# Patient Record
Sex: MALE | Race: WHITE | NOT HISPANIC OR LATINO | Employment: OTHER | URBAN - NONMETROPOLITAN AREA
[De-identification: names, ages, dates, MRNs, and addresses within clinical notes are randomized per-mention and may not be internally consistent; named-entity substitution may affect disease eponyms.]

---

## 2017-01-06 ENCOUNTER — TRANSCRIBE ORDERS (OUTPATIENT)
Dept: GENERAL RADIOLOGY | Facility: HOSPITAL | Age: 82
End: 2017-01-06

## 2017-01-06 ENCOUNTER — HOSPITAL ENCOUNTER (OUTPATIENT)
Dept: GENERAL RADIOLOGY | Facility: HOSPITAL | Age: 82
Discharge: HOME | End: 2017-01-06
Admitting: NURSE PRACTITIONER

## 2017-01-06 DIAGNOSIS — R05.9 COUGH: Primary | ICD-10-CM

## 2017-01-06 PROCEDURE — 71020 HC CHEST PA AND LATERAL: CPT

## 2017-05-13 ENCOUNTER — APPOINTMENT (OUTPATIENT)
Dept: GENERAL RADIOLOGY | Facility: HOSPITAL | Age: 82
End: 2017-05-13

## 2017-05-13 ENCOUNTER — HOSPITAL ENCOUNTER (INPATIENT)
Facility: HOSPITAL | Age: 82
LOS: 4 days | Discharge: HOME | End: 2017-05-17
Attending: EMERGENCY MEDICINE | Admitting: INTERNAL MEDICINE

## 2017-05-13 DIAGNOSIS — R13.12 OROPHARYNGEAL DYSPHAGIA: ICD-10-CM

## 2017-05-13 DIAGNOSIS — Z74.09 IMPAIRED MOBILITY: ICD-10-CM

## 2017-05-13 DIAGNOSIS — R40.0 SOMNOLENCE: ICD-10-CM

## 2017-05-13 DIAGNOSIS — A41.9 SEPSIS, DUE TO UNSPECIFIED ORGANISM: Primary | ICD-10-CM

## 2017-05-13 LAB
ALBUMIN SERPL-MCNC: 3.4 G/DL (ref 3.5–5)
ALBUMIN/GLOB SERPL: 1.2 G/DL (ref 1.1–2.5)
ALP SERPL-CCNC: 51 U/L (ref 24–120)
ALT SERPL W P-5'-P-CCNC: 15 U/L (ref 0–54)
ANION GAP SERPL CALCULATED.3IONS-SCNC: 10 MMOL/L (ref 4–13)
ANION GAP SERPL CALCULATED.3IONS-SCNC: 11 MMOL/L (ref 4–13)
APTT PPP: 47.5 SECONDS (ref 24.1–34.8)
ARTERIAL PATENCY WRIST A: ABNORMAL
AST SERPL-CCNC: 25 U/L (ref 7–45)
ATMOSPHERIC PRESS: ABNORMAL MMHG
BASE EXCESS BLDA CALC-SCNC: 3.8 MMOL/L (ref -2–2)
BASOPHILS # BLD AUTO: 0.03 10*3/MM3 (ref 0–0.2)
BASOPHILS # BLD AUTO: 0.03 10*3/MM3 (ref 0–0.2)
BASOPHILS NFR BLD AUTO: 0.3 % (ref 0–2)
BASOPHILS NFR BLD AUTO: 0.4 % (ref 0–2)
BDY SITE: ABNORMAL
BILIRUB SERPL-MCNC: 0.4 MG/DL (ref 0.1–1)
BILIRUB UR QL STRIP: NEGATIVE
BUN BLD-MCNC: 23 MG/DL (ref 5–21)
BUN BLD-MCNC: 24 MG/DL (ref 5–21)
BUN/CREAT SERPL: 14.3 (ref 7–25)
BUN/CREAT SERPL: 15.3 (ref 7–25)
CA-I BLDA-SCNC: 1.11 MMOL/L (ref 1.1–1.35)
CALCIUM SPEC-SCNC: 8.1 MG/DL (ref 8.4–10.4)
CALCIUM SPEC-SCNC: 8.7 MG/DL (ref 8.4–10.4)
CHLORIDE SERPL-SCNC: 102 MMOL/L (ref 98–110)
CHLORIDE SERPL-SCNC: 104 MMOL/L (ref 98–110)
CK SERPL-CCNC: 95 U/L (ref 0–203)
CLARITY UR: CLEAR
CO2 SERPL-SCNC: 28 MMOL/L (ref 24–31)
CO2 SERPL-SCNC: 28 MMOL/L (ref 24–31)
COHGB MFR BLD: 1.3 % (ref 0–5.1)
COLOR UR: YELLOW
CREAT BLD-MCNC: 1.57 MG/DL (ref 0.5–1.4)
CREAT BLD-MCNC: 1.61 MG/DL (ref 0.5–1.4)
CRP SERPL-MCNC: 8.4 MG/DL (ref 0–0.99)
D-LACTATE SERPL-SCNC: 2.2 MMOL/L (ref 0.5–2)
D-LACTATE SERPL-SCNC: 2.5 MMOL/L (ref 0.5–2)
DEPRECATED RDW RBC AUTO: 46.4 FL (ref 40–54)
DEPRECATED RDW RBC AUTO: 47.7 FL (ref 40–54)
DIGOXIN SERPL-MCNC: 1.3 NG/ML (ref 0.8–2)
EOSINOPHIL # BLD AUTO: 0.13 10*3/MM3 (ref 0–0.7)
EOSINOPHIL # BLD AUTO: 0.19 10*3/MM3 (ref 0–0.7)
EOSINOPHIL NFR BLD AUTO: 1.3 % (ref 0–4)
EOSINOPHIL NFR BLD AUTO: 2.4 % (ref 0–4)
ERYTHROCYTE [DISTWIDTH] IN BLOOD BY AUTOMATED COUNT: 14.5 % (ref 12–15)
ERYTHROCYTE [DISTWIDTH] IN BLOOD BY AUTOMATED COUNT: 14.8 % (ref 12–15)
FLUAV AG NPH QL: NEGATIVE
FLUBV AG NPH QL IA: NEGATIVE
GAS FLOW AIRWAY: 2.5 LPM
GFR SERPL CREATININE-BSD FRML MDRD: 41 ML/MIN/1.73
GFR SERPL CREATININE-BSD FRML MDRD: 42 ML/MIN/1.73
GLOBULIN UR ELPH-MCNC: 2.8 GM/DL
GLUCOSE BLD-MCNC: 100 MG/DL (ref 70–100)
GLUCOSE BLD-MCNC: 115 MG/DL (ref 70–100)
GLUCOSE UR STRIP-MCNC: NEGATIVE MG/DL
HCO3 BLDA-SCNC: 27.9 MMOL/L (ref 22–26)
HCT VFR BLD AUTO: 37.6 % (ref 40–52)
HCT VFR BLD AUTO: 37.8 % (ref 40–52)
HCT VFR BLD CALC: 38 % (ref 38–51)
HGB BLD-MCNC: 12.1 G/DL (ref 14–18)
HGB BLD-MCNC: 12.1 G/DL (ref 14–18)
HGB BLDA-MCNC: 12.8 G/DL (ref 14–18)
HGB UR QL STRIP.AUTO: NEGATIVE
HOLD SPECIMEN: NORMAL
HOLD SPECIMEN: NORMAL
IMM GRANULOCYTES # BLD: 0.01 10*3/MM3 (ref 0–0.03)
IMM GRANULOCYTES # BLD: 0.02 10*3/MM3 (ref 0–0.03)
IMM GRANULOCYTES NFR BLD: 0.1 % (ref 0–5)
IMM GRANULOCYTES NFR BLD: 0.2 % (ref 0–5)
INR PPP: 1.67 (ref 0.91–1.09)
KETONES UR QL STRIP: NEGATIVE
LEUKOCYTE ESTERASE UR QL STRIP.AUTO: NEGATIVE
LYMPHOCYTES # BLD AUTO: 0.48 10*3/MM3 (ref 0.72–4.86)
LYMPHOCYTES # BLD AUTO: 0.83 10*3/MM3 (ref 0.72–4.86)
LYMPHOCYTES NFR BLD AUTO: 10.6 % (ref 15–45)
LYMPHOCYTES NFR BLD AUTO: 4.9 % (ref 15–45)
MAGNESIUM SERPL-MCNC: 1.7 MG/DL (ref 1.4–2.2)
MCH RBC QN AUTO: 28 PG (ref 28–32)
MCH RBC QN AUTO: 28.1 PG (ref 28–32)
MCHC RBC AUTO-ENTMCNC: 32 G/DL (ref 33–36)
MCHC RBC AUTO-ENTMCNC: 32.2 G/DL (ref 33–36)
MCV RBC AUTO: 87 FL (ref 82–95)
MCV RBC AUTO: 87.7 FL (ref 82–95)
METHGB BLD QL: 0.4 % (ref 0.4–1.5)
MODALITY: ABNORMAL
MONOCYTES # BLD AUTO: 0.88 10*3/MM3 (ref 0.19–1.3)
MONOCYTES # BLD AUTO: 0.94 10*3/MM3 (ref 0.19–1.3)
MONOCYTES NFR BLD AUTO: 11.3 % (ref 4–12)
MONOCYTES NFR BLD AUTO: 9.7 % (ref 4–12)
NEUTROPHILS # BLD AUTO: 5.87 10*3/MM3 (ref 1.87–8.4)
NEUTROPHILS # BLD AUTO: 8.1 10*3/MM3 (ref 1.87–8.4)
NEUTROPHILS NFR BLD AUTO: 75.2 % (ref 39–78)
NEUTROPHILS NFR BLD AUTO: 83.6 % (ref 39–78)
NITRITE UR QL STRIP: NEGATIVE
NT-PROBNP SERPL-MCNC: 5200 PG/ML (ref 0–1800)
OXYHGB MFR BLDV: 94.1 % (ref 94–97)
PCO2 BLDA: 39.9 MM HG (ref 35–45)
PH BLDA: 7.46 PH UNITS (ref 7.35–7.45)
PH UR STRIP.AUTO: 5.5 [PH] (ref 5–8)
PHOSPHATE SERPL-MCNC: 3.4 MG/DL (ref 2.5–4.5)
PLATELET # BLD AUTO: 146 10*3/MM3 (ref 130–400)
PLATELET # BLD AUTO: 156 10*3/MM3 (ref 130–400)
PMV BLD AUTO: 10.1 FL (ref 6–12)
PMV BLD AUTO: 10.1 FL (ref 6–12)
PO2 BLDA: 71.8 MM HG (ref 80–100)
POTASSIUM BLD-SCNC: 4.3 MMOL/L (ref 3.5–5.3)
POTASSIUM BLD-SCNC: 4.3 MMOL/L (ref 3.5–5.3)
POTASSIUM BLDA-SCNC: 4.24 MMOL/L (ref 3.5–5)
PROT SERPL-MCNC: 6.2 G/DL (ref 6.3–8.7)
PROT UR QL STRIP: NEGATIVE
PROTHROMBIN TIME: 20.3 SECONDS (ref 11.9–14.6)
RBC # BLD AUTO: 4.31 10*6/MM3 (ref 4.8–5.9)
RBC # BLD AUTO: 4.32 10*6/MM3 (ref 4.8–5.9)
SODIUM BLD-SCNC: 141 MMOL/L (ref 135–145)
SODIUM BLD-SCNC: 142 MMOL/L (ref 135–145)
SODIUM BLDA-SCNC: 143.8 MMOL/L (ref 135–145)
SP GR UR STRIP: 1.02 (ref 1–1.03)
TROPONIN I SERPL-MCNC: 0.06 NG/ML (ref 0–0.07)
UROBILINOGEN UR QL STRIP: NORMAL
WBC NRBC COR # BLD: 7.81 10*3/MM3 (ref 4.8–10.8)
WBC NRBC COR # BLD: 9.7 10*3/MM3 (ref 4.8–10.8)
WHOLE BLOOD HOLD SPECIMEN: NORMAL
WHOLE BLOOD HOLD SPECIMEN: NORMAL

## 2017-05-13 PROCEDURE — 74020 HC XR ABDOMEN FLAT & UPRIGHT: CPT

## 2017-05-13 PROCEDURE — 36415 COLL VENOUS BLD VENIPUNCTURE: CPT | Performed by: NURSE PRACTITIONER

## 2017-05-13 PROCEDURE — 85025 COMPLETE CBC W/AUTO DIFF WBC: CPT | Performed by: NURSE PRACTITIONER

## 2017-05-13 PROCEDURE — 94760 N-INVAS EAR/PLS OXIMETRY 1: CPT

## 2017-05-13 PROCEDURE — 83605 ASSAY OF LACTIC ACID: CPT | Performed by: EMERGENCY MEDICINE

## 2017-05-13 PROCEDURE — 86140 C-REACTIVE PROTEIN: CPT | Performed by: EMERGENCY MEDICINE

## 2017-05-13 PROCEDURE — 83880 ASSAY OF NATRIURETIC PEPTIDE: CPT | Performed by: EMERGENCY MEDICINE

## 2017-05-13 PROCEDURE — 94799 UNLISTED PULMONARY SVC/PX: CPT

## 2017-05-13 PROCEDURE — 82330 ASSAY OF CALCIUM: CPT

## 2017-05-13 PROCEDURE — 87804 INFLUENZA ASSAY W/OPTIC: CPT | Performed by: NURSE PRACTITIONER

## 2017-05-13 PROCEDURE — 85025 COMPLETE CBC W/AUTO DIFF WBC: CPT | Performed by: EMERGENCY MEDICINE

## 2017-05-13 PROCEDURE — 82550 ASSAY OF CK (CPK): CPT | Performed by: EMERGENCY MEDICINE

## 2017-05-13 PROCEDURE — 85610 PROTHROMBIN TIME: CPT | Performed by: EMERGENCY MEDICINE

## 2017-05-13 PROCEDURE — 84484 ASSAY OF TROPONIN QUANT: CPT

## 2017-05-13 PROCEDURE — 71010 HC CHEST PA OR AP: CPT

## 2017-05-13 PROCEDURE — 93010 ELECTROCARDIOGRAM REPORT: CPT | Performed by: INTERNAL MEDICINE

## 2017-05-13 PROCEDURE — 85730 THROMBOPLASTIN TIME PARTIAL: CPT | Performed by: EMERGENCY MEDICINE

## 2017-05-13 PROCEDURE — 80053 COMPREHEN METABOLIC PANEL: CPT | Performed by: EMERGENCY MEDICINE

## 2017-05-13 PROCEDURE — 93005 ELECTROCARDIOGRAM TRACING: CPT | Performed by: EMERGENCY MEDICINE

## 2017-05-13 PROCEDURE — 84100 ASSAY OF PHOSPHORUS: CPT | Performed by: EMERGENCY MEDICINE

## 2017-05-13 PROCEDURE — 94640 AIRWAY INHALATION TREATMENT: CPT

## 2017-05-13 PROCEDURE — 25010000002 PIPERACILLIN-TAZOBACTAM: Performed by: EMERGENCY MEDICINE

## 2017-05-13 PROCEDURE — 87040 BLOOD CULTURE FOR BACTERIA: CPT | Performed by: EMERGENCY MEDICINE

## 2017-05-13 PROCEDURE — 36600 WITHDRAWAL OF ARTERIAL BLOOD: CPT

## 2017-05-13 PROCEDURE — 25810000003 SODIUM CHLORIDE 0.9 % WITH KCL 20 MEQ 20-0.9 MEQ/L-% SOLUTION: Performed by: NURSE PRACTITIONER

## 2017-05-13 PROCEDURE — 82805 BLOOD GASES W/O2 SATURATION: CPT

## 2017-05-13 PROCEDURE — 83050 HGB METHEMOGLOBIN QUAN: CPT

## 2017-05-13 PROCEDURE — 81003 URINALYSIS AUTO W/O SCOPE: CPT | Performed by: EMERGENCY MEDICINE

## 2017-05-13 PROCEDURE — 25010000002 VANCOMYCIN: Performed by: EMERGENCY MEDICINE

## 2017-05-13 PROCEDURE — 99285 EMERGENCY DEPT VISIT HI MDM: CPT

## 2017-05-13 PROCEDURE — 25010000002 CEFTRIAXONE: Performed by: NURSE PRACTITIONER

## 2017-05-13 PROCEDURE — 83735 ASSAY OF MAGNESIUM: CPT | Performed by: EMERGENCY MEDICINE

## 2017-05-13 PROCEDURE — 82375 ASSAY CARBOXYHB QUANT: CPT

## 2017-05-13 PROCEDURE — 80162 ASSAY OF DIGOXIN TOTAL: CPT | Performed by: EMERGENCY MEDICINE

## 2017-05-13 RX ORDER — HYDROCODONE BITARTRATE AND ACETAMINOPHEN 5; 325 MG/1; MG/1
1 TABLET ORAL EVERY 4 HOURS PRN
Status: DISCONTINUED | OUTPATIENT
Start: 2017-05-13 | End: 2017-05-17 | Stop reason: HOSPADM

## 2017-05-13 RX ORDER — ACETAMINOPHEN 325 MG/1
650 TABLET ORAL EVERY 4 HOURS PRN
Status: DISCONTINUED | OUTPATIENT
Start: 2017-05-13 | End: 2017-05-17 | Stop reason: HOSPADM

## 2017-05-13 RX ORDER — CARVEDILOL 6.25 MG/1
12.5 TABLET ORAL 2 TIMES DAILY WITH MEALS
Status: DISCONTINUED | OUTPATIENT
Start: 2017-05-13 | End: 2017-05-17 | Stop reason: HOSPADM

## 2017-05-13 RX ORDER — DOCUSATE SODIUM 100 MG/1
100 CAPSULE, LIQUID FILLED ORAL 2 TIMES DAILY
Status: DISCONTINUED | OUTPATIENT
Start: 2017-05-13 | End: 2017-05-17 | Stop reason: HOSPADM

## 2017-05-13 RX ORDER — SODIUM CHLORIDE 0.9 % (FLUSH) 0.9 %
1-10 SYRINGE (ML) INJECTION AS NEEDED
Status: DISCONTINUED | OUTPATIENT
Start: 2017-05-13 | End: 2017-05-17 | Stop reason: HOSPADM

## 2017-05-13 RX ORDER — ALBUTEROL SULFATE 2.5 MG/3ML
2.5 SOLUTION RESPIRATORY (INHALATION)
Status: DISCONTINUED | OUTPATIENT
Start: 2017-05-13 | End: 2017-05-17 | Stop reason: HOSPADM

## 2017-05-13 RX ORDER — MEMANTINE HYDROCHLORIDE 5 MG/1
10 TABLET ORAL EVERY 12 HOURS SCHEDULED
Status: DISCONTINUED | OUTPATIENT
Start: 2017-05-13 | End: 2017-05-17 | Stop reason: HOSPADM

## 2017-05-13 RX ORDER — ALLOPURINOL 100 MG/1
200 TABLET ORAL DAILY
COMMUNITY

## 2017-05-13 RX ORDER — IPRATROPIUM BROMIDE AND ALBUTEROL SULFATE 2.5; .5 MG/3ML; MG/3ML
3 SOLUTION RESPIRATORY (INHALATION) EVERY 4 HOURS PRN
COMMUNITY

## 2017-05-13 RX ORDER — ONDANSETRON 2 MG/ML
4 INJECTION INTRAMUSCULAR; INTRAVENOUS EVERY 6 HOURS PRN
Status: DISCONTINUED | OUTPATIENT
Start: 2017-05-13 | End: 2017-05-17 | Stop reason: HOSPADM

## 2017-05-13 RX ORDER — ALLOPURINOL 100 MG/1
100 TABLET ORAL DAILY
Status: DISCONTINUED | OUTPATIENT
Start: 2017-05-13 | End: 2017-05-17 | Stop reason: HOSPADM

## 2017-05-13 RX ORDER — MEMANTINE HYDROCHLORIDE 28 MG/1
28 CAPSULE, EXTENDED RELEASE ORAL DAILY
Status: DISCONTINUED | OUTPATIENT
Start: 2017-05-13 | End: 2017-05-13 | Stop reason: CLARIF

## 2017-05-13 RX ORDER — DIGOXIN 125 MCG
125 TABLET ORAL
Status: DISCONTINUED | OUTPATIENT
Start: 2017-05-14 | End: 2017-05-17 | Stop reason: HOSPADM

## 2017-05-13 RX ORDER — OMEPRAZOLE 40 MG/1
40 CAPSULE, DELAYED RELEASE ORAL DAILY
COMMUNITY
End: 2017-05-13

## 2017-05-13 RX ORDER — ASPIRIN 81 MG/1
81 TABLET, CHEWABLE ORAL DAILY
Status: DISCONTINUED | OUTPATIENT
Start: 2017-05-13 | End: 2017-05-17 | Stop reason: HOSPADM

## 2017-05-13 RX ORDER — SODIUM CHLORIDE 0.9 % (FLUSH) 0.9 %
10 SYRINGE (ML) INJECTION AS NEEDED
Status: DISCONTINUED | OUTPATIENT
Start: 2017-05-13 | End: 2017-05-13 | Stop reason: SDUPTHER

## 2017-05-13 RX ORDER — CARVEDILOL 12.5 MG/1
12.5 TABLET ORAL 2 TIMES DAILY WITH MEALS
COMMUNITY

## 2017-05-13 RX ORDER — ASPIRIN 81 MG/1
81 TABLET, CHEWABLE ORAL DAILY
COMMUNITY

## 2017-05-13 RX ORDER — SODIUM CHLORIDE 0.9 % (FLUSH) 0.9 %
10 SYRINGE (ML) INJECTION AS NEEDED
Status: DISCONTINUED | OUTPATIENT
Start: 2017-05-13 | End: 2017-05-17 | Stop reason: HOSPADM

## 2017-05-13 RX ORDER — MEMANTINE HYDROCHLORIDE 28 MG/1
28 CAPSULE, EXTENDED RELEASE ORAL DAILY
COMMUNITY

## 2017-05-13 RX ORDER — OLANZAPINE 5 MG/1
5 TABLET ORAL NIGHTLY
Status: DISCONTINUED | OUTPATIENT
Start: 2017-05-13 | End: 2017-05-17 | Stop reason: HOSPADM

## 2017-05-13 RX ORDER — OLANZAPINE 5 MG/1
5 TABLET ORAL NIGHTLY
COMMUNITY

## 2017-05-13 RX ORDER — DIGOXIN 125 MCG
125 TABLET ORAL
COMMUNITY

## 2017-05-13 RX ORDER — SODIUM CHLORIDE AND POTASSIUM CHLORIDE 150; 900 MG/100ML; MG/100ML
50 INJECTION, SOLUTION INTRAVENOUS CONTINUOUS
Status: DISCONTINUED | OUTPATIENT
Start: 2017-05-13 | End: 2017-05-15

## 2017-05-13 RX ORDER — LEVOFLOXACIN 500 MG/1
500 TABLET, FILM COATED ORAL DAILY
COMMUNITY
End: 2017-05-17 | Stop reason: HOSPADM

## 2017-05-13 RX ADMIN — CEFTRIAXONE 1 G: 1 INJECTION, POWDER, FOR SOLUTION INTRAMUSCULAR; INTRAVENOUS at 17:25

## 2017-05-13 RX ADMIN — ACETAMINOPHEN 325 MG: 325 SUPPOSITORY RECTAL at 12:43

## 2017-05-13 RX ADMIN — VANCOMYCIN HYDROCHLORIDE 500 MG: 500 INJECTION, POWDER, LYOPHILIZED, FOR SOLUTION INTRAVENOUS at 14:09

## 2017-05-13 RX ADMIN — PIPERACILLIN SODIUM,TAZOBACTAM SODIUM 3.38 G: 3; .375 INJECTION, POWDER, FOR SOLUTION INTRAVENOUS at 13:10

## 2017-05-13 RX ADMIN — ALBUTEROL SULFATE 2.5 MG: 2.5 SOLUTION RESPIRATORY (INHALATION) at 20:13

## 2017-05-13 RX ADMIN — MEMANTINE HYDROCHLORIDE 10 MG: 5 TABLET, FILM COATED ORAL at 21:03

## 2017-05-13 RX ADMIN — SODIUM CHLORIDE 2178 ML: 9 INJECTION, SOLUTION INTRAVENOUS at 13:10

## 2017-05-13 RX ADMIN — DOXYCYCLINE 100 MG: 100 INJECTION, POWDER, LYOPHILIZED, FOR SOLUTION INTRAVENOUS at 17:25

## 2017-05-13 RX ADMIN — ACETAMINOPHEN 650 MG: 325 SUPPOSITORY RECTAL at 12:43

## 2017-05-13 RX ADMIN — POTASSIUM CHLORIDE AND SODIUM CHLORIDE 50 ML/HR: 900; 150 INJECTION, SOLUTION INTRAVENOUS at 17:25

## 2017-05-13 RX ADMIN — OLANZAPINE 5 MG: 5 TABLET, FILM COATED ORAL at 21:03

## 2017-05-14 ENCOUNTER — APPOINTMENT (OUTPATIENT)
Dept: GENERAL RADIOLOGY | Facility: HOSPITAL | Age: 82
End: 2017-05-14

## 2017-05-14 LAB
ANION GAP SERPL CALCULATED.3IONS-SCNC: 9 MMOL/L (ref 4–13)
BASOPHILS # BLD MANUAL: 0.08 10*3/MM3 (ref 0–0.2)
BASOPHILS NFR BLD AUTO: 1 % (ref 0–2)
BUN BLD-MCNC: 25 MG/DL (ref 5–21)
BUN/CREAT SERPL: 19.4 (ref 7–25)
CALCIUM SPEC-SCNC: 7.8 MG/DL (ref 8.4–10.4)
CHLORIDE SERPL-SCNC: 110 MMOL/L (ref 98–110)
CO2 SERPL-SCNC: 24 MMOL/L (ref 24–31)
CREAT BLD-MCNC: 1.29 MG/DL (ref 0.5–1.4)
DEPRECATED RDW RBC AUTO: 48.2 FL (ref 40–54)
EOSINOPHIL # BLD MANUAL: 0.08 10*3/MM3 (ref 0–0.7)
EOSINOPHIL NFR BLD MANUAL: 1 % (ref 0–4)
ERYTHROCYTE [DISTWIDTH] IN BLOOD BY AUTOMATED COUNT: 15.1 % (ref 12–15)
GFR SERPL CREATININE-BSD FRML MDRD: 52 ML/MIN/1.73
GLUCOSE BLD-MCNC: 84 MG/DL (ref 70–100)
HCT VFR BLD AUTO: 34 % (ref 40–52)
HGB BLD-MCNC: 10.9 G/DL (ref 14–18)
LYMPHOCYTES # BLD MANUAL: 0.72 10*3/MM3 (ref 0.72–4.86)
LYMPHOCYTES NFR BLD MANUAL: 10 % (ref 4–12)
LYMPHOCYTES NFR BLD MANUAL: 9 % (ref 15–45)
MCH RBC QN AUTO: 28 PG (ref 28–32)
MCHC RBC AUTO-ENTMCNC: 32.1 G/DL (ref 33–36)
MCV RBC AUTO: 87.4 FL (ref 82–95)
MONOCYTES # BLD AUTO: 0.8 10*3/MM3 (ref 0.19–1.3)
NEUTROPHILS # BLD AUTO: 6.02 10*3/MM3 (ref 1.87–8.4)
NEUTROPHILS NFR BLD MANUAL: 71 % (ref 39–78)
NEUTS BAND NFR BLD MANUAL: 4 % (ref 0–10)
PLAT MORPH BLD: NORMAL
PLATELET # BLD AUTO: 142 10*3/MM3 (ref 130–400)
PMV BLD AUTO: 10.4 FL (ref 6–12)
POTASSIUM BLD-SCNC: 4.2 MMOL/L (ref 3.5–5.3)
RBC # BLD AUTO: 3.89 10*6/MM3 (ref 4.8–5.9)
RBC MORPH BLD: NORMAL
SCAN SLIDE: NORMAL
SODIUM BLD-SCNC: 143 MMOL/L (ref 135–145)
VARIANT LYMPHS NFR BLD MANUAL: 4 % (ref 0–5)
WBC MORPH BLD: NORMAL
WBC NRBC COR # BLD: 8.02 10*3/MM3 (ref 4.8–10.8)

## 2017-05-14 PROCEDURE — 80048 BASIC METABOLIC PNL TOTAL CA: CPT | Performed by: NURSE PRACTITIONER

## 2017-05-14 PROCEDURE — 85007 BL SMEAR W/DIFF WBC COUNT: CPT | Performed by: NURSE PRACTITIONER

## 2017-05-14 PROCEDURE — 94799 UNLISTED PULMONARY SVC/PX: CPT

## 2017-05-14 PROCEDURE — 85025 COMPLETE CBC W/AUTO DIFF WBC: CPT | Performed by: NURSE PRACTITIONER

## 2017-05-14 PROCEDURE — 92610 EVALUATE SWALLOWING FUNCTION: CPT | Performed by: SPEECH-LANGUAGE PATHOLOGIST

## 2017-05-14 PROCEDURE — G8997 SWALLOW GOAL STATUS: HCPCS | Performed by: SPEECH-LANGUAGE PATHOLOGIST

## 2017-05-14 PROCEDURE — 71010 HC CHEST PA OR AP: CPT

## 2017-05-14 PROCEDURE — G8996 SWALLOW CURRENT STATUS: HCPCS | Performed by: SPEECH-LANGUAGE PATHOLOGIST

## 2017-05-14 PROCEDURE — 25010000002 CEFTRIAXONE: Performed by: NURSE PRACTITIONER

## 2017-05-14 PROCEDURE — 25810000003 SODIUM CHLORIDE 0.9 % WITH KCL 20 MEQ 20-0.9 MEQ/L-% SOLUTION: Performed by: NURSE PRACTITIONER

## 2017-05-14 PROCEDURE — 94760 N-INVAS EAR/PLS OXIMETRY 1: CPT

## 2017-05-14 RX ORDER — BISACODYL 10 MG
10 SUPPOSITORY, RECTAL RECTAL DAILY
Status: DISCONTINUED | OUTPATIENT
Start: 2017-05-14 | End: 2017-05-17 | Stop reason: HOSPADM

## 2017-05-14 RX ORDER — ACETAMINOPHEN 10 MG/ML
1000 INJECTION, SOLUTION INTRAVENOUS EVERY 6 HOURS PRN
Status: DISCONTINUED | OUTPATIENT
Start: 2017-05-14 | End: 2017-05-17 | Stop reason: HOSPADM

## 2017-05-14 RX ADMIN — ALBUTEROL SULFATE 2.5 MG: 2.5 SOLUTION RESPIRATORY (INHALATION) at 06:54

## 2017-05-14 RX ADMIN — ALBUTEROL SULFATE 2.5 MG: 2.5 SOLUTION RESPIRATORY (INHALATION) at 00:21

## 2017-05-14 RX ADMIN — OLANZAPINE 5 MG: 5 TABLET, FILM COATED ORAL at 21:28

## 2017-05-14 RX ADMIN — DOCUSATE SODIUM 100 MG: 100 CAPSULE ORAL at 15:54

## 2017-05-14 RX ADMIN — CARVEDILOL 12.5 MG: 6.25 TABLET, FILM COATED ORAL at 15:54

## 2017-05-14 RX ADMIN — CARVEDILOL 12.5 MG: 6.25 TABLET, FILM COATED ORAL at 16:02

## 2017-05-14 RX ADMIN — DOCUSATE SODIUM 100 MG: 100 CAPSULE ORAL at 16:02

## 2017-05-14 RX ADMIN — DOXYCYCLINE 100 MG: 100 INJECTION, POWDER, LYOPHILIZED, FOR SOLUTION INTRAVENOUS at 15:45

## 2017-05-14 RX ADMIN — ALBUTEROL SULFATE 2.5 MG: 2.5 SOLUTION RESPIRATORY (INHALATION) at 19:49

## 2017-05-14 RX ADMIN — DOXYCYCLINE 100 MG: 100 INJECTION, POWDER, LYOPHILIZED, FOR SOLUTION INTRAVENOUS at 05:53

## 2017-05-14 RX ADMIN — BISACODYL 10 MG: 10 SUPPOSITORY RECTAL at 21:28

## 2017-05-14 RX ADMIN — ACETAMINOPHEN 1000 MG: 10 INJECTION, SOLUTION INTRAVENOUS at 02:31

## 2017-05-14 RX ADMIN — POTASSIUM CHLORIDE AND SODIUM CHLORIDE 50 ML/HR: 900; 150 INJECTION, SOLUTION INTRAVENOUS at 11:26

## 2017-05-14 RX ADMIN — ALBUTEROL SULFATE 2.5 MG: 2.5 SOLUTION RESPIRATORY (INHALATION) at 11:50

## 2017-05-14 RX ADMIN — MEMANTINE HYDROCHLORIDE 10 MG: 5 TABLET, FILM COATED ORAL at 21:28

## 2017-05-14 RX ADMIN — CEFTRIAXONE 1 G: 1 INJECTION, POWDER, FOR SOLUTION INTRAMUSCULAR; INTRAVENOUS at 15:45

## 2017-05-15 LAB
ANION GAP SERPL CALCULATED.3IONS-SCNC: 12 MMOL/L (ref 4–13)
BASOPHILS # BLD AUTO: 0.03 10*3/MM3 (ref 0–0.2)
BASOPHILS NFR BLD AUTO: 0.3 % (ref 0–2)
BUN BLD-MCNC: 20 MG/DL (ref 5–21)
BUN/CREAT SERPL: 17.4 (ref 7–25)
CALCIUM SPEC-SCNC: 8.5 MG/DL (ref 8.4–10.4)
CHLORIDE SERPL-SCNC: 108 MMOL/L (ref 98–110)
CO2 SERPL-SCNC: 24 MMOL/L (ref 24–31)
CREAT BLD-MCNC: 1.15 MG/DL (ref 0.5–1.4)
DEPRECATED RDW RBC AUTO: 48.9 FL (ref 40–54)
EOSINOPHIL # BLD AUTO: 0.22 10*3/MM3 (ref 0–0.7)
EOSINOPHIL NFR BLD AUTO: 2.5 % (ref 0–4)
ERYTHROCYTE [DISTWIDTH] IN BLOOD BY AUTOMATED COUNT: 15.2 % (ref 12–15)
GFR SERPL CREATININE-BSD FRML MDRD: 60 ML/MIN/1.73
GLUCOSE BLD-MCNC: 80 MG/DL (ref 70–100)
HCT VFR BLD AUTO: 37.2 % (ref 40–52)
HGB BLD-MCNC: 11.9 G/DL (ref 14–18)
IMM GRANULOCYTES # BLD: 0.02 10*3/MM3 (ref 0–0.03)
IMM GRANULOCYTES NFR BLD: 0.2 % (ref 0–5)
LYMPHOCYTES # BLD AUTO: 1.38 10*3/MM3 (ref 0.72–4.86)
LYMPHOCYTES NFR BLD AUTO: 15.6 % (ref 15–45)
MCH RBC QN AUTO: 28 PG (ref 28–32)
MCHC RBC AUTO-ENTMCNC: 32 G/DL (ref 33–36)
MCV RBC AUTO: 87.5 FL (ref 82–95)
MONOCYTES # BLD AUTO: 0.96 10*3/MM3 (ref 0.19–1.3)
MONOCYTES NFR BLD AUTO: 10.8 % (ref 4–12)
NEUTROPHILS # BLD AUTO: 6.25 10*3/MM3 (ref 1.87–8.4)
NEUTROPHILS NFR BLD AUTO: 70.6 % (ref 39–78)
PLATELET # BLD AUTO: 151 10*3/MM3 (ref 130–400)
PMV BLD AUTO: 10.2 FL (ref 6–12)
POTASSIUM BLD-SCNC: 4.1 MMOL/L (ref 3.5–5.3)
RBC # BLD AUTO: 4.25 10*6/MM3 (ref 4.8–5.9)
SODIUM BLD-SCNC: 144 MMOL/L (ref 135–145)
WBC NRBC COR # BLD: 8.86 10*3/MM3 (ref 4.8–10.8)

## 2017-05-15 PROCEDURE — 92526 ORAL FUNCTION THERAPY: CPT

## 2017-05-15 PROCEDURE — G8981 BODY POS CURRENT STATUS: HCPCS

## 2017-05-15 PROCEDURE — 80048 BASIC METABOLIC PNL TOTAL CA: CPT | Performed by: NURSE PRACTITIONER

## 2017-05-15 PROCEDURE — 85025 COMPLETE CBC W/AUTO DIFF WBC: CPT | Performed by: NURSE PRACTITIONER

## 2017-05-15 PROCEDURE — 97161 PT EVAL LOW COMPLEX 20 MIN: CPT

## 2017-05-15 PROCEDURE — 94760 N-INVAS EAR/PLS OXIMETRY 1: CPT

## 2017-05-15 PROCEDURE — 25010000002 CEFTRIAXONE: Performed by: NURSE PRACTITIONER

## 2017-05-15 PROCEDURE — G8982 BODY POS GOAL STATUS: HCPCS

## 2017-05-15 PROCEDURE — 94799 UNLISTED PULMONARY SVC/PX: CPT

## 2017-05-15 PROCEDURE — 36415 COLL VENOUS BLD VENIPUNCTURE: CPT | Performed by: NURSE PRACTITIONER

## 2017-05-15 RX ADMIN — ALBUTEROL SULFATE 2.5 MG: 2.5 SOLUTION RESPIRATORY (INHALATION) at 10:40

## 2017-05-15 RX ADMIN — APIXABAN 5 MG: 5 TABLET, FILM COATED ORAL at 18:07

## 2017-05-15 RX ADMIN — BISACODYL 10 MG: 10 SUPPOSITORY RECTAL at 15:09

## 2017-05-15 RX ADMIN — ALBUTEROL SULFATE 2.5 MG: 2.5 SOLUTION RESPIRATORY (INHALATION) at 00:04

## 2017-05-15 RX ADMIN — DIGOXIN 125 MCG: 0.12 TABLET ORAL at 12:57

## 2017-05-15 RX ADMIN — OLANZAPINE 5 MG: 5 TABLET, FILM COATED ORAL at 21:12

## 2017-05-15 RX ADMIN — CARVEDILOL 12.5 MG: 6.25 TABLET, FILM COATED ORAL at 18:07

## 2017-05-15 RX ADMIN — ALBUTEROL SULFATE 2.5 MG: 2.5 SOLUTION RESPIRATORY (INHALATION) at 06:47

## 2017-05-15 RX ADMIN — CEFTRIAXONE 1 G: 1 INJECTION, POWDER, FOR SOLUTION INTRAMUSCULAR; INTRAVENOUS at 18:06

## 2017-05-15 RX ADMIN — ALBUTEROL SULFATE 2.5 MG: 2.5 SOLUTION RESPIRATORY (INHALATION) at 18:53

## 2017-05-15 RX ADMIN — DOXYCYCLINE 100 MG: 100 INJECTION, POWDER, LYOPHILIZED, FOR SOLUTION INTRAVENOUS at 19:04

## 2017-05-15 RX ADMIN — DOXYCYCLINE 100 MG: 100 INJECTION, POWDER, LYOPHILIZED, FOR SOLUTION INTRAVENOUS at 05:51

## 2017-05-16 ENCOUNTER — APPOINTMENT (OUTPATIENT)
Dept: GENERAL RADIOLOGY | Facility: HOSPITAL | Age: 82
End: 2017-05-16

## 2017-05-16 LAB
ANION GAP SERPL CALCULATED.3IONS-SCNC: 10 MMOL/L (ref 4–13)
BASOPHILS # BLD AUTO: 0.02 10*3/MM3 (ref 0–0.2)
BASOPHILS NFR BLD AUTO: 0.3 % (ref 0–2)
BUN BLD-MCNC: 20 MG/DL (ref 5–21)
BUN/CREAT SERPL: 17.5 (ref 7–25)
CALCIUM SPEC-SCNC: 8.7 MG/DL (ref 8.4–10.4)
CHLORIDE SERPL-SCNC: 105 MMOL/L (ref 98–110)
CO2 SERPL-SCNC: 28 MMOL/L (ref 24–31)
CREAT BLD-MCNC: 1.14 MG/DL (ref 0.5–1.4)
DEPRECATED RDW RBC AUTO: 48.4 FL (ref 40–54)
EOSINOPHIL # BLD AUTO: 0.24 10*3/MM3 (ref 0–0.7)
EOSINOPHIL NFR BLD AUTO: 3.4 % (ref 0–4)
ERYTHROCYTE [DISTWIDTH] IN BLOOD BY AUTOMATED COUNT: 15.1 % (ref 12–15)
GFR SERPL CREATININE-BSD FRML MDRD: 60 ML/MIN/1.73
GLUCOSE BLD-MCNC: 76 MG/DL (ref 70–100)
HCT VFR BLD AUTO: 37.5 % (ref 40–52)
HGB BLD-MCNC: 11.8 G/DL (ref 14–18)
IMM GRANULOCYTES # BLD: 0.03 10*3/MM3 (ref 0–0.03)
IMM GRANULOCYTES NFR BLD: 0.4 % (ref 0–5)
LYMPHOCYTES # BLD AUTO: 1.33 10*3/MM3 (ref 0.72–4.86)
LYMPHOCYTES NFR BLD AUTO: 18.6 % (ref 15–45)
MCH RBC QN AUTO: 27.7 PG (ref 28–32)
MCHC RBC AUTO-ENTMCNC: 31.5 G/DL (ref 33–36)
MCV RBC AUTO: 88 FL (ref 82–95)
MONOCYTES # BLD AUTO: 0.77 10*3/MM3 (ref 0.19–1.3)
MONOCYTES NFR BLD AUTO: 10.8 % (ref 4–12)
NEUTROPHILS # BLD AUTO: 4.76 10*3/MM3 (ref 1.87–8.4)
NEUTROPHILS NFR BLD AUTO: 66.5 % (ref 39–78)
PLATELET # BLD AUTO: 167 10*3/MM3 (ref 130–400)
PMV BLD AUTO: 10.4 FL (ref 6–12)
POTASSIUM BLD-SCNC: 4.3 MMOL/L (ref 3.5–5.3)
RBC # BLD AUTO: 4.26 10*6/MM3 (ref 4.8–5.9)
SODIUM BLD-SCNC: 143 MMOL/L (ref 135–145)
WBC NRBC COR # BLD: 7.15 10*3/MM3 (ref 4.8–10.8)

## 2017-05-16 PROCEDURE — G8997 SWALLOW GOAL STATUS: HCPCS

## 2017-05-16 PROCEDURE — 94760 N-INVAS EAR/PLS OXIMETRY 1: CPT

## 2017-05-16 PROCEDURE — G8996 SWALLOW CURRENT STATUS: HCPCS

## 2017-05-16 PROCEDURE — 85025 COMPLETE CBC W/AUTO DIFF WBC: CPT | Performed by: NURSE PRACTITIONER

## 2017-05-16 PROCEDURE — 92611 MOTION FLUOROSCOPY/SWALLOW: CPT

## 2017-05-16 PROCEDURE — 97110 THERAPEUTIC EXERCISES: CPT

## 2017-05-16 PROCEDURE — 94799 UNLISTED PULMONARY SVC/PX: CPT

## 2017-05-16 PROCEDURE — 80048 BASIC METABOLIC PNL TOTAL CA: CPT | Performed by: NURSE PRACTITIONER

## 2017-05-16 PROCEDURE — 74230 X-RAY XM SWLNG FUNCJ C+: CPT

## 2017-05-16 PROCEDURE — 25010000002 CEFTRIAXONE: Performed by: NURSE PRACTITIONER

## 2017-05-16 PROCEDURE — 92507 TX SP LANG VOICE COMM INDIV: CPT

## 2017-05-16 RX ADMIN — Medication 81 MG: at 10:33

## 2017-05-16 RX ADMIN — CEFTRIAXONE 1 G: 1 INJECTION, POWDER, FOR SOLUTION INTRAMUSCULAR; INTRAVENOUS at 17:56

## 2017-05-16 RX ADMIN — DOXYCYCLINE 100 MG: 100 INJECTION, POWDER, LYOPHILIZED, FOR SOLUTION INTRAVENOUS at 19:03

## 2017-05-16 RX ADMIN — ALBUTEROL SULFATE 2.5 MG: 2.5 SOLUTION RESPIRATORY (INHALATION) at 00:00

## 2017-05-16 RX ADMIN — BISACODYL 10 MG: 10 SUPPOSITORY RECTAL at 12:24

## 2017-05-16 RX ADMIN — MEMANTINE HYDROCHLORIDE 10 MG: 5 TABLET, FILM COATED ORAL at 10:33

## 2017-05-16 RX ADMIN — DIGOXIN 125 MCG: 0.12 TABLET ORAL at 12:24

## 2017-05-16 RX ADMIN — CARVEDILOL 12.5 MG: 6.25 TABLET, FILM COATED ORAL at 10:34

## 2017-05-16 RX ADMIN — ALBUTEROL SULFATE 2.5 MG: 2.5 SOLUTION RESPIRATORY (INHALATION) at 19:12

## 2017-05-16 RX ADMIN — ALBUTEROL SULFATE 2.5 MG: 2.5 SOLUTION RESPIRATORY (INHALATION) at 11:16

## 2017-05-16 RX ADMIN — ALBUTEROL SULFATE 2.5 MG: 2.5 SOLUTION RESPIRATORY (INHALATION) at 06:57

## 2017-05-16 RX ADMIN — ALBUTEROL SULFATE 2.5 MG: 2.5 SOLUTION RESPIRATORY (INHALATION) at 23:18

## 2017-05-16 RX ADMIN — DOXYCYCLINE 100 MG: 100 INJECTION, POWDER, LYOPHILIZED, FOR SOLUTION INTRAVENOUS at 05:14

## 2017-05-16 RX ADMIN — ALLOPURINOL 100 MG: 100 TABLET ORAL at 10:34

## 2017-05-16 RX ADMIN — APIXABAN 5 MG: 5 TABLET, FILM COATED ORAL at 10:34

## 2017-05-17 VITALS
SYSTOLIC BLOOD PRESSURE: 122 MMHG | HEIGHT: 73 IN | OXYGEN SATURATION: 96 % | RESPIRATION RATE: 16 BRPM | HEART RATE: 69 BPM | TEMPERATURE: 98.4 F | BODY MASS INDEX: 20.34 KG/M2 | DIASTOLIC BLOOD PRESSURE: 61 MMHG | WEIGHT: 153.5 LBS

## 2017-05-17 PROBLEM — A41.9 SEPSIS (HCC): Status: RESOLVED | Noted: 2017-05-13 | Resolved: 2017-05-17

## 2017-05-17 PROBLEM — R50.9 FEBRILE ILLNESS: Status: ACTIVE | Noted: 2017-05-17

## 2017-05-17 PROBLEM — R65.10 SYSTEMIC INFLAMMATORY RESPONSE SYNDROME (HCC): Status: ACTIVE | Noted: 2017-05-17

## 2017-05-17 PROBLEM — B34.9 VIRAL SYNDROME: Status: ACTIVE | Noted: 2017-05-17

## 2017-05-17 LAB
ANION GAP SERPL CALCULATED.3IONS-SCNC: 11 MMOL/L (ref 4–13)
BUN BLD-MCNC: 20 MG/DL (ref 5–21)
BUN/CREAT SERPL: 20.6 (ref 7–25)
CALCIUM SPEC-SCNC: 8.7 MG/DL (ref 8.4–10.4)
CHLORIDE SERPL-SCNC: 104 MMOL/L (ref 98–110)
CO2 SERPL-SCNC: 28 MMOL/L (ref 24–31)
CREAT BLD-MCNC: 0.97 MG/DL (ref 0.5–1.4)
GFR SERPL CREATININE-BSD FRML MDRD: 73 ML/MIN/1.73
GLUCOSE BLD-MCNC: 81 MG/DL (ref 70–100)
POTASSIUM BLD-SCNC: 3.5 MMOL/L (ref 3.5–5.3)
SODIUM BLD-SCNC: 143 MMOL/L (ref 135–145)

## 2017-05-17 PROCEDURE — 92526 ORAL FUNCTION THERAPY: CPT | Performed by: SPEECH-LANGUAGE PATHOLOGIST

## 2017-05-17 PROCEDURE — 94760 N-INVAS EAR/PLS OXIMETRY 1: CPT

## 2017-05-17 PROCEDURE — 94799 UNLISTED PULMONARY SVC/PX: CPT

## 2017-05-17 PROCEDURE — 25010000002 CEFTRIAXONE: Performed by: NURSE PRACTITIONER

## 2017-05-17 PROCEDURE — 97110 THERAPEUTIC EXERCISES: CPT

## 2017-05-17 PROCEDURE — 80048 BASIC METABOLIC PNL TOTAL CA: CPT | Performed by: NURSE PRACTITIONER

## 2017-05-17 PROCEDURE — 36415 COLL VENOUS BLD VENIPUNCTURE: CPT | Performed by: NURSE PRACTITIONER

## 2017-05-17 RX ADMIN — DOCUSATE SODIUM 100 MG: 100 CAPSULE ORAL at 09:00

## 2017-05-17 RX ADMIN — CARVEDILOL 12.5 MG: 6.25 TABLET, FILM COATED ORAL at 09:00

## 2017-05-17 RX ADMIN — APIXABAN 5 MG: 5 TABLET, FILM COATED ORAL at 09:00

## 2017-05-17 RX ADMIN — ALBUTEROL SULFATE 2.5 MG: 2.5 SOLUTION RESPIRATORY (INHALATION) at 11:18

## 2017-05-17 RX ADMIN — DOXYCYCLINE 100 MG: 100 INJECTION, POWDER, LYOPHILIZED, FOR SOLUTION INTRAVENOUS at 06:11

## 2017-05-17 RX ADMIN — CEFTRIAXONE 1 G: 1 INJECTION, POWDER, FOR SOLUTION INTRAMUSCULAR; INTRAVENOUS at 14:37

## 2017-05-17 RX ADMIN — ALBUTEROL SULFATE 2.5 MG: 2.5 SOLUTION RESPIRATORY (INHALATION) at 07:16

## 2017-05-17 RX ADMIN — MEMANTINE HYDROCHLORIDE 10 MG: 5 TABLET, FILM COATED ORAL at 09:00

## 2017-05-17 RX ADMIN — Medication 81 MG: at 09:00

## 2017-05-17 RX ADMIN — ALLOPURINOL 100 MG: 100 TABLET ORAL at 09:00

## 2017-05-18 LAB
BACTERIA SPEC AEROBE CULT: NORMAL
BACTERIA SPEC AEROBE CULT: NORMAL

## 2017-05-22 ENCOUNTER — TELEPHONE (OUTPATIENT)
Dept: SOCIAL WORK | Facility: HOSPITAL | Age: 82
End: 2017-05-22

## 2017-06-15 ENCOUNTER — TRANSCRIBE ORDERS (OUTPATIENT)
Dept: GENERAL RADIOLOGY | Facility: HOSPITAL | Age: 82
End: 2017-06-15

## 2017-06-15 ENCOUNTER — HOSPITAL ENCOUNTER (OUTPATIENT)
Dept: GENERAL RADIOLOGY | Facility: HOSPITAL | Age: 82
Discharge: HOME | End: 2017-06-15
Attending: INTERNAL MEDICINE | Admitting: INTERNAL MEDICINE

## 2017-06-15 DIAGNOSIS — K59.00 UNSPECIFIED CONSTIPATION: ICD-10-CM

## 2017-06-15 DIAGNOSIS — J20.9 ACUTE BRONCHITIS, UNSPECIFIED ORGANISM: Primary | ICD-10-CM

## 2017-06-15 PROCEDURE — 74000 HC ABDOMEN KUB: CPT

## 2017-06-15 PROCEDURE — 71020 HC CHEST PA AND LATERAL: CPT

## 2017-07-25 RX ORDER — APIXABAN 5 MG/1
TABLET, FILM COATED ORAL
Qty: 60 TABLET | Refills: 0 | Status: SHIPPED | OUTPATIENT
Start: 2017-07-25 | End: 2017-08-23 | Stop reason: SDUPTHER

## 2017-07-27 RX ORDER — APIXABAN 5 MG/1
TABLET, FILM COATED ORAL
Qty: 60 TABLET | Refills: 11 | OUTPATIENT
Start: 2017-07-27

## 2017-08-23 RX ORDER — APIXABAN 5 MG/1
TABLET, FILM COATED ORAL
Qty: 60 TABLET | Refills: 0 | Status: SHIPPED | OUTPATIENT
Start: 2017-08-23

## 2017-09-25 RX ORDER — APIXABAN 5 MG/1
TABLET, FILM COATED ORAL
Qty: 60 TABLET | Refills: 0 | OUTPATIENT
Start: 2017-09-25

## 2018-03-22 ENCOUNTER — APPOINTMENT (OUTPATIENT)
Dept: CT IMAGING | Facility: HOSPITAL | Age: 83
End: 2018-03-22

## 2018-03-22 ENCOUNTER — HOSPITAL ENCOUNTER (INPATIENT)
Facility: HOSPITAL | Age: 83
LOS: 4 days | Discharge: HOSPICE | End: 2018-03-27
Attending: EMERGENCY MEDICINE | Admitting: FAMILY MEDICINE

## 2018-03-22 ENCOUNTER — APPOINTMENT (OUTPATIENT)
Dept: GENERAL RADIOLOGY | Facility: HOSPITAL | Age: 83
End: 2018-03-22

## 2018-03-22 DIAGNOSIS — Z74.09 IMPAIRED FUNCTIONAL MOBILITY, BALANCE, GAIT, AND ENDURANCE: ICD-10-CM

## 2018-03-22 DIAGNOSIS — R13.10 DYSPHAGIA, UNSPECIFIED TYPE: ICD-10-CM

## 2018-03-22 DIAGNOSIS — J18.9 PNEUMONIA OF RIGHT LUNG DUE TO INFECTIOUS ORGANISM, UNSPECIFIED PART OF LUNG: ICD-10-CM

## 2018-03-22 DIAGNOSIS — R41.82 ALTERED MENTAL STATUS, UNSPECIFIED ALTERED MENTAL STATUS TYPE: Primary | ICD-10-CM

## 2018-03-22 LAB
ATMOSPHERIC PRESS: 759 MMHG
BASE EXCESS BLDV CALC-SCNC: 4 MMOL/L (ref 0–2)
BDY SITE: ABNORMAL
BILIRUB UR QL STRIP: NEGATIVE
BODY TEMPERATURE: 37 C
CLARITY UR: CLEAR
COLOR UR: YELLOW
GLUCOSE UR STRIP-MCNC: NEGATIVE MG/DL
HCO3 BLDV-SCNC: 31.1 MMOL/L (ref 22–28)
HGB UR QL STRIP.AUTO: NEGATIVE
KETONES UR QL STRIP: NEGATIVE
LEUKOCYTE ESTERASE UR QL STRIP.AUTO: NEGATIVE
Lab: ABNORMAL
MODALITY: ABNORMAL
NITRITE UR QL STRIP: NEGATIVE
PCO2 BLDV: 55.4 MM HG (ref 41–51)
PH BLDV: 7.36 PH UNITS (ref 7.32–7.42)
PH UR STRIP.AUTO: 5.5 [PH] (ref 5–8)
PO2 BLDV: 28.7 MM HG (ref 27–53)
PROT UR QL STRIP: NEGATIVE
SAO2 % BLDCOV: 54.1 % (ref 45–75)
SP GR UR STRIP: 1.02 (ref 1–1.03)
UROBILINOGEN UR QL STRIP: NORMAL
VENTILATOR MODE: ABNORMAL

## 2018-03-22 PROCEDURE — 84145 PROCALCITONIN (PCT): CPT | Performed by: EMERGENCY MEDICINE

## 2018-03-22 PROCEDURE — 82330 ASSAY OF CALCIUM: CPT

## 2018-03-22 PROCEDURE — 71045 X-RAY EXAM CHEST 1 VIEW: CPT

## 2018-03-22 PROCEDURE — 85025 COMPLETE CBC W/AUTO DIFF WBC: CPT | Performed by: EMERGENCY MEDICINE

## 2018-03-22 PROCEDURE — 81003 URINALYSIS AUTO W/O SCOPE: CPT | Performed by: EMERGENCY MEDICINE

## 2018-03-22 PROCEDURE — 25010000002 LORAZEPAM PER 2 MG: Performed by: EMERGENCY MEDICINE

## 2018-03-22 PROCEDURE — 87040 BLOOD CULTURE FOR BACTERIA: CPT | Performed by: EMERGENCY MEDICINE

## 2018-03-22 PROCEDURE — 85610 PROTHROMBIN TIME: CPT | Performed by: EMERGENCY MEDICINE

## 2018-03-22 PROCEDURE — 83605 ASSAY OF LACTIC ACID: CPT | Performed by: EMERGENCY MEDICINE

## 2018-03-22 PROCEDURE — 93010 ELECTROCARDIOGRAM REPORT: CPT | Performed by: INTERNAL MEDICINE

## 2018-03-22 PROCEDURE — 82803 BLOOD GASES ANY COMBINATION: CPT

## 2018-03-22 PROCEDURE — 80162 ASSAY OF DIGOXIN TOTAL: CPT | Performed by: EMERGENCY MEDICINE

## 2018-03-22 PROCEDURE — 99285 EMERGENCY DEPT VISIT HI MDM: CPT

## 2018-03-22 PROCEDURE — 84100 ASSAY OF PHOSPHORUS: CPT | Performed by: EMERGENCY MEDICINE

## 2018-03-22 PROCEDURE — 83735 ASSAY OF MAGNESIUM: CPT | Performed by: EMERGENCY MEDICINE

## 2018-03-22 PROCEDURE — 80053 COMPREHEN METABOLIC PANEL: CPT | Performed by: EMERGENCY MEDICINE

## 2018-03-22 PROCEDURE — 93005 ELECTROCARDIOGRAM TRACING: CPT | Performed by: EMERGENCY MEDICINE

## 2018-03-22 PROCEDURE — 70450 CT HEAD/BRAIN W/O DYE: CPT

## 2018-03-22 RX ORDER — NIACIN 100 MG
100 TABLET ORAL
COMMUNITY

## 2018-03-22 RX ORDER — OMEPRAZOLE 20 MG/1
40 CAPSULE, DELAYED RELEASE ORAL DAILY
COMMUNITY

## 2018-03-22 RX ORDER — LORAZEPAM 2 MG/ML
2 INJECTION INTRAMUSCULAR ONCE
Status: COMPLETED | OUTPATIENT
Start: 2018-03-22 | End: 2018-03-22

## 2018-03-22 RX ORDER — SODIUM CHLORIDE 0.9 % (FLUSH) 0.9 %
10 SYRINGE (ML) INJECTION AS NEEDED
Status: DISCONTINUED | OUTPATIENT
Start: 2018-03-22 | End: 2018-03-27 | Stop reason: HOSPADM

## 2018-03-22 RX ADMIN — LORAZEPAM 2 MG: 2 INJECTION INTRAMUSCULAR; INTRAVENOUS at 23:06

## 2018-03-23 ENCOUNTER — APPOINTMENT (OUTPATIENT)
Dept: CT IMAGING | Facility: HOSPITAL | Age: 83
End: 2018-03-23

## 2018-03-23 PROBLEM — R41.82 ALTERED MENTAL STATUS: Status: ACTIVE | Noted: 2018-03-23

## 2018-03-23 PROBLEM — F03.90 DEMENTIA (HCC): Status: ACTIVE | Noted: 2018-03-23

## 2018-03-23 PROBLEM — J18.9 PNEUMONIA: Status: ACTIVE | Noted: 2018-03-23

## 2018-03-23 LAB
ALBUMIN SERPL-MCNC: 3.5 G/DL (ref 3.5–5)
ALBUMIN SERPL-MCNC: 3.7 G/DL (ref 3.5–5)
ALBUMIN/GLOB SERPL: 1.1 G/DL (ref 1.1–2.5)
ALBUMIN/GLOB SERPL: 1.2 G/DL (ref 1.1–2.5)
ALP SERPL-CCNC: 65 U/L (ref 24–120)
ALP SERPL-CCNC: 67 U/L (ref 24–120)
ALT SERPL W P-5'-P-CCNC: 18 U/L (ref 0–54)
ALT SERPL W P-5'-P-CCNC: 21 U/L (ref 0–54)
ANION GAP SERPL CALCULATED.3IONS-SCNC: 10 MMOL/L (ref 4–13)
ANION GAP SERPL CALCULATED.3IONS-SCNC: 9 MMOL/L (ref 4–13)
AST SERPL-CCNC: 26 U/L (ref 7–45)
AST SERPL-CCNC: 30 U/L (ref 7–45)
BASOPHILS # BLD AUTO: 0.03 10*3/MM3 (ref 0–0.2)
BASOPHILS # BLD AUTO: 0.05 10*3/MM3 (ref 0–0.2)
BASOPHILS # BLD AUTO: 0.06 10*3/MM3 (ref 0–0.2)
BASOPHILS NFR BLD AUTO: 0.3 % (ref 0–2)
BASOPHILS NFR BLD AUTO: 0.4 % (ref 0–2)
BASOPHILS NFR BLD AUTO: 0.5 % (ref 0–2)
BILIRUB SERPL-MCNC: 0.7 MG/DL (ref 0.1–1)
BILIRUB SERPL-MCNC: 0.7 MG/DL (ref 0.1–1)
BUN BLD-MCNC: 27 MG/DL (ref 5–21)
BUN BLD-MCNC: 27 MG/DL (ref 5–21)
BUN/CREAT SERPL: 21.8 (ref 7–25)
BUN/CREAT SERPL: 23.3 (ref 7–25)
CA-I BLD-MCNC: 4.55 MG/DL (ref 4.6–5.4)
CALCIUM SPEC-SCNC: 8.9 MG/DL (ref 8.4–10.4)
CALCIUM SPEC-SCNC: 9.1 MG/DL (ref 8.4–10.4)
CHLORIDE SERPL-SCNC: 104 MMOL/L (ref 98–110)
CHLORIDE SERPL-SCNC: 105 MMOL/L (ref 98–110)
CO2 SERPL-SCNC: 31 MMOL/L (ref 24–31)
CO2 SERPL-SCNC: 32 MMOL/L (ref 24–31)
CREAT BLD-MCNC: 1.16 MG/DL (ref 0.5–1.4)
CREAT BLD-MCNC: 1.24 MG/DL (ref 0.5–1.4)
D-LACTATE SERPL-SCNC: 1.9 MMOL/L (ref 0.5–2)
DEPRECATED RDW RBC AUTO: 45.1 FL (ref 40–54)
DEPRECATED RDW RBC AUTO: 45.3 FL (ref 40–54)
DEPRECATED RDW RBC AUTO: 45.6 FL (ref 40–54)
DIGOXIN SERPL-MCNC: <0.4 NG/ML (ref 0.8–2)
EOSINOPHIL # BLD AUTO: 0.26 10*3/MM3 (ref 0–0.7)
EOSINOPHIL # BLD AUTO: 0.3 10*3/MM3 (ref 0–0.7)
EOSINOPHIL # BLD AUTO: 0.34 10*3/MM3 (ref 0–0.7)
EOSINOPHIL NFR BLD AUTO: 2.6 % (ref 0–4)
EOSINOPHIL NFR BLD AUTO: 2.7 % (ref 0–4)
EOSINOPHIL NFR BLD AUTO: 2.8 % (ref 0–4)
ERYTHROCYTE [DISTWIDTH] IN BLOOD BY AUTOMATED COUNT: 13.9 % (ref 12–15)
FLUAV AG NPH QL: NEGATIVE
FLUBV AG NPH QL IA: NEGATIVE
GFR SERPL CREATININE-BSD FRML MDRD: 55 ML/MIN/1.73
GFR SERPL CREATININE-BSD FRML MDRD: 59 ML/MIN/1.73
GLOBULIN UR ELPH-MCNC: 3.1 GM/DL
GLOBULIN UR ELPH-MCNC: 3.3 GM/DL
GLUCOSE BLD-MCNC: 101 MG/DL (ref 70–100)
GLUCOSE BLD-MCNC: 111 MG/DL (ref 70–100)
HCT VFR BLD AUTO: 41.7 % (ref 40–52)
HCT VFR BLD AUTO: 42 % (ref 40–52)
HCT VFR BLD AUTO: 43.3 % (ref 40–52)
HGB BLD-MCNC: 13.7 G/DL (ref 14–18)
HGB BLD-MCNC: 14 G/DL (ref 14–18)
HGB BLD-MCNC: 14.3 G/DL (ref 14–18)
HOLD SPECIMEN: NORMAL
HOLD SPECIMEN: NORMAL
IMM GRANULOCYTES # BLD: 0.03 10*3/MM3 (ref 0–0.03)
IMM GRANULOCYTES # BLD: 0.04 10*3/MM3 (ref 0–0.03)
IMM GRANULOCYTES # BLD: 0.07 10*3/MM3 (ref 0–0.03)
IMM GRANULOCYTES NFR BLD: 0.3 % (ref 0–5)
IMM GRANULOCYTES NFR BLD: 0.4 % (ref 0–5)
IMM GRANULOCYTES NFR BLD: 0.6 % (ref 0–5)
INR PPP: 1.08 (ref 0.91–1.09)
LYMPHOCYTES # BLD AUTO: 1.29 10*3/MM3 (ref 0.72–4.86)
LYMPHOCYTES # BLD AUTO: 1.58 10*3/MM3 (ref 0.72–4.86)
LYMPHOCYTES # BLD AUTO: 1.65 10*3/MM3 (ref 0.72–4.86)
LYMPHOCYTES NFR BLD AUTO: 13.3 % (ref 15–45)
LYMPHOCYTES NFR BLD AUTO: 13.8 % (ref 15–45)
LYMPHOCYTES NFR BLD AUTO: 13.8 % (ref 15–45)
Lab: ABNORMAL
MAGNESIUM SERPL-MCNC: 2 MG/DL (ref 1.4–2.2)
MCH RBC QN AUTO: 29.4 PG (ref 28–32)
MCH RBC QN AUTO: 29.4 PG (ref 28–32)
MCH RBC QN AUTO: 30 PG (ref 28–32)
MCHC RBC AUTO-ENTMCNC: 32.9 G/DL (ref 33–36)
MCHC RBC AUTO-ENTMCNC: 33 G/DL (ref 33–36)
MCHC RBC AUTO-ENTMCNC: 33.3 G/DL (ref 33–36)
MCV RBC AUTO: 88.9 FL (ref 82–95)
MCV RBC AUTO: 89.5 FL (ref 82–95)
MCV RBC AUTO: 89.9 FL (ref 82–95)
MONOCYTES # BLD AUTO: 0.84 10*3/MM3 (ref 0.19–1.3)
MONOCYTES # BLD AUTO: 1.08 10*3/MM3 (ref 0.19–1.3)
MONOCYTES # BLD AUTO: 1.18 10*3/MM3 (ref 0.19–1.3)
MONOCYTES NFR BLD AUTO: 10.3 % (ref 4–12)
MONOCYTES NFR BLD AUTO: 8.6 % (ref 4–12)
MONOCYTES NFR BLD AUTO: 9 % (ref 4–12)
NEUTROPHILS # BLD AUTO: 7.27 10*3/MM3 (ref 1.87–8.4)
NEUTROPHILS # BLD AUTO: 8.28 10*3/MM3 (ref 1.87–8.4)
NEUTROPHILS # BLD AUTO: 8.76 10*3/MM3 (ref 1.87–8.4)
NEUTROPHILS NFR BLD AUTO: 72.6 % (ref 39–78)
NEUTROPHILS NFR BLD AUTO: 73.3 % (ref 39–78)
NEUTROPHILS NFR BLD AUTO: 74.7 % (ref 39–78)
NRBC BLD MANUAL-RTO: 0 /100 WBC (ref 0–0)
PHOSPHATE SERPL-MCNC: 3.5 MG/DL (ref 2.5–4.5)
PLATELET # BLD AUTO: 182 10*3/MM3 (ref 130–400)
PLATELET # BLD AUTO: 195 10*3/MM3 (ref 130–400)
PLATELET # BLD AUTO: 199 10*3/MM3 (ref 130–400)
PMV BLD AUTO: 10.1 FL (ref 6–12)
PMV BLD AUTO: 9.5 FL (ref 6–12)
PMV BLD AUTO: 9.7 FL (ref 6–12)
POTASSIUM BLD-SCNC: 4.1 MMOL/L (ref 3.5–5.3)
POTASSIUM BLD-SCNC: 4.2 MMOL/L (ref 3.5–5.3)
PROCALCITONIN SERPL-MCNC: <0.25 NG/ML
PROT SERPL-MCNC: 6.8 G/DL (ref 6.3–8.7)
PROT SERPL-MCNC: 6.8 G/DL (ref 6.3–8.7)
PROTHROMBIN TIME: 14.3 SECONDS (ref 11.9–14.6)
RBC # BLD AUTO: 4.66 10*6/MM3 (ref 4.8–5.9)
RBC # BLD AUTO: 4.67 10*6/MM3 (ref 4.8–5.9)
RBC # BLD AUTO: 4.87 10*6/MM3 (ref 4.8–5.9)
SODIUM BLD-SCNC: 145 MMOL/L (ref 135–145)
SODIUM BLD-SCNC: 146 MMOL/L (ref 135–145)
WBC NRBC COR # BLD: 11.42 10*3/MM3 (ref 4.8–10.8)
WBC NRBC COR # BLD: 11.96 10*3/MM3 (ref 4.8–10.8)
WBC NRBC COR # BLD: 9.73 10*3/MM3 (ref 4.8–10.8)
WHOLE BLOOD HOLD SPECIMEN: NORMAL
WHOLE BLOOD HOLD SPECIMEN: NORMAL

## 2018-03-23 PROCEDURE — 87040 BLOOD CULTURE FOR BACTERIA: CPT | Performed by: INTERNAL MEDICINE

## 2018-03-23 PROCEDURE — 87040 BLOOD CULTURE FOR BACTERIA: CPT | Performed by: EMERGENCY MEDICINE

## 2018-03-23 PROCEDURE — 94640 AIRWAY INHALATION TREATMENT: CPT

## 2018-03-23 PROCEDURE — 25010000002 AZITHROMYCIN PER 500 MG: Performed by: EMERGENCY MEDICINE

## 2018-03-23 PROCEDURE — 85025 COMPLETE CBC W/AUTO DIFF WBC: CPT | Performed by: INTERNAL MEDICINE

## 2018-03-23 PROCEDURE — 80053 COMPREHEN METABOLIC PANEL: CPT | Performed by: INTERNAL MEDICINE

## 2018-03-23 PROCEDURE — 85025 COMPLETE CBC W/AUTO DIFF WBC: CPT | Performed by: EMERGENCY MEDICINE

## 2018-03-23 PROCEDURE — G8997 SWALLOW GOAL STATUS: HCPCS

## 2018-03-23 PROCEDURE — G8996 SWALLOW CURRENT STATUS: HCPCS

## 2018-03-23 PROCEDURE — 25010000002 THIAMINE PER 100 MG: Performed by: FAMILY MEDICINE

## 2018-03-23 PROCEDURE — 87804 INFLUENZA ASSAY W/OPTIC: CPT | Performed by: EMERGENCY MEDICINE

## 2018-03-23 PROCEDURE — 25010000002 CEFTRIAXONE PER 250 MG: Performed by: EMERGENCY MEDICINE

## 2018-03-23 PROCEDURE — 25010000002 ENOXAPARIN PER 10 MG: Performed by: FAMILY MEDICINE

## 2018-03-23 PROCEDURE — 92610 EVALUATE SWALLOWING FUNCTION: CPT

## 2018-03-23 PROCEDURE — 71250 CT THORAX DX C-: CPT

## 2018-03-23 PROCEDURE — 94799 UNLISTED PULMONARY SVC/PX: CPT

## 2018-03-23 RX ORDER — CARVEDILOL 6.25 MG/1
12.5 TABLET ORAL 2 TIMES DAILY WITH MEALS
Status: DISCONTINUED | OUTPATIENT
Start: 2018-03-23 | End: 2018-03-23

## 2018-03-23 RX ORDER — METOPROLOL TARTRATE 5 MG/5ML
2.5 INJECTION INTRAVENOUS 4 TIMES DAILY PRN
Status: DISCONTINUED | OUTPATIENT
Start: 2018-03-23 | End: 2018-03-27 | Stop reason: HOSPADM

## 2018-03-23 RX ORDER — SODIUM CHLORIDE 0.9 % (FLUSH) 0.9 %
1-10 SYRINGE (ML) INJECTION AS NEEDED
Status: DISCONTINUED | OUTPATIENT
Start: 2018-03-23 | End: 2018-03-27 | Stop reason: HOSPADM

## 2018-03-23 RX ORDER — ACETAMINOPHEN 325 MG/1
650 TABLET ORAL EVERY 4 HOURS PRN
Status: DISCONTINUED | OUTPATIENT
Start: 2018-03-23 | End: 2018-03-27 | Stop reason: HOSPADM

## 2018-03-23 RX ORDER — IPRATROPIUM BROMIDE AND ALBUTEROL SULFATE 2.5; .5 MG/3ML; MG/3ML
3 SOLUTION RESPIRATORY (INHALATION)
Status: DISCONTINUED | OUTPATIENT
Start: 2018-03-23 | End: 2018-03-27 | Stop reason: HOSPADM

## 2018-03-23 RX ORDER — SODIUM CHLORIDE 9 MG/ML
100 INJECTION, SOLUTION INTRAVENOUS CONTINUOUS
Status: DISCONTINUED | OUTPATIENT
Start: 2018-03-23 | End: 2018-03-23

## 2018-03-23 RX ORDER — IPRATROPIUM BROMIDE AND ALBUTEROL SULFATE 2.5; .5 MG/3ML; MG/3ML
3 SOLUTION RESPIRATORY (INHALATION) EVERY 4 HOURS PRN
Status: DISCONTINUED | OUTPATIENT
Start: 2018-03-23 | End: 2018-03-23

## 2018-03-23 RX ORDER — OLANZAPINE 5 MG/1
5 TABLET ORAL NIGHTLY
Status: DISCONTINUED | OUTPATIENT
Start: 2018-03-23 | End: 2018-03-23

## 2018-03-23 RX ORDER — ALLOPURINOL 100 MG/1
200 TABLET ORAL DAILY
Status: DISCONTINUED | OUTPATIENT
Start: 2018-03-23 | End: 2018-03-23

## 2018-03-23 RX ORDER — PANTOPRAZOLE SODIUM 40 MG/1
40 TABLET, DELAYED RELEASE ORAL EVERY MORNING
Status: DISCONTINUED | OUTPATIENT
Start: 2018-03-23 | End: 2018-03-23

## 2018-03-23 RX ORDER — DIGOXIN 125 MCG
125 TABLET ORAL
Status: DISCONTINUED | OUTPATIENT
Start: 2018-03-23 | End: 2018-03-23

## 2018-03-23 RX ORDER — ASPIRIN 81 MG/1
81 TABLET, CHEWABLE ORAL DAILY
Status: DISCONTINUED | OUTPATIENT
Start: 2018-03-23 | End: 2018-03-23

## 2018-03-23 RX ORDER — CLINDAMYCIN PHOSPHATE 900 MG/50ML
900 INJECTION INTRAVENOUS EVERY 8 HOURS
Status: DISCONTINUED | OUTPATIENT
Start: 2018-03-23 | End: 2018-03-27 | Stop reason: HOSPADM

## 2018-03-23 RX ORDER — MEMANTINE HYDROCHLORIDE 5 MG/1
10 TABLET ORAL EVERY 12 HOURS SCHEDULED
Status: DISCONTINUED | OUTPATIENT
Start: 2018-03-23 | End: 2018-03-23

## 2018-03-23 RX ORDER — SODIUM CHLORIDE 450 MG/100ML
75 INJECTION, SOLUTION INTRAVENOUS CONTINUOUS
Status: DISCONTINUED | OUTPATIENT
Start: 2018-03-23 | End: 2018-03-27 | Stop reason: HOSPADM

## 2018-03-23 RX ORDER — FAMOTIDINE 10 MG/ML
20 INJECTION, SOLUTION INTRAVENOUS EVERY 12 HOURS SCHEDULED
Status: DISCONTINUED | OUTPATIENT
Start: 2018-03-23 | End: 2018-03-24

## 2018-03-23 RX ORDER — ONDANSETRON 2 MG/ML
4 INJECTION INTRAMUSCULAR; INTRAVENOUS EVERY 6 HOURS PRN
Status: DISCONTINUED | OUTPATIENT
Start: 2018-03-23 | End: 2018-03-27 | Stop reason: HOSPADM

## 2018-03-23 RX ADMIN — IPRATROPIUM BROMIDE AND ALBUTEROL SULFATE 3 ML: 2.5; .5 SOLUTION RESPIRATORY (INHALATION) at 16:23

## 2018-03-23 RX ADMIN — SODIUM CHLORIDE 100 ML/HR: 9 INJECTION, SOLUTION INTRAVENOUS at 05:56

## 2018-03-23 RX ADMIN — CEFTRIAXONE SODIUM 2 G: 2 INJECTION, POWDER, FOR SOLUTION INTRAMUSCULAR; INTRAVENOUS at 03:17

## 2018-03-23 RX ADMIN — CLINDAMYCIN PHOSPHATE 900 MG: 18 INJECTION, SOLUTION INTRAVENOUS at 20:21

## 2018-03-23 RX ADMIN — IPRATROPIUM BROMIDE AND ALBUTEROL SULFATE 3 ML: 2.5; .5 SOLUTION RESPIRATORY (INHALATION) at 19:55

## 2018-03-23 RX ADMIN — FAMOTIDINE 20 MG: 10 INJECTION INTRAVENOUS at 17:31

## 2018-03-23 RX ADMIN — ENOXAPARIN SODIUM 40 MG: 40 INJECTION SUBCUTANEOUS at 17:31

## 2018-03-23 RX ADMIN — CLINDAMYCIN PHOSPHATE 900 MG: 18 INJECTION, SOLUTION INTRAVENOUS at 15:17

## 2018-03-23 RX ADMIN — CLINDAMYCIN PHOSPHATE 900 MG: 18 INJECTION, SOLUTION INTRAVENOUS at 05:56

## 2018-03-23 RX ADMIN — AZITHROMYCIN MONOHYDRATE 500 MG: 500 INJECTION, POWDER, LYOPHILIZED, FOR SOLUTION INTRAVENOUS at 03:43

## 2018-03-23 RX ADMIN — THIAMINE HYDROCHLORIDE 75 ML/HR: 100 INJECTION, SOLUTION INTRAMUSCULAR; INTRAVENOUS at 15:17

## 2018-03-23 RX ADMIN — FAMOTIDINE 20 MG: 10 INJECTION INTRAVENOUS at 20:24

## 2018-03-23 NOTE — PROGRESS NOTES
Patient is sleeping and the the IS is room as well as the sputum cup. Tech will check back in to start Incentive.

## 2018-03-23 NOTE — ED PROVIDER NOTES
Subjective   History of Present Illness     89-year-old male brought in by his daughter for confusion earlier today.  The daughter states that the patient has a baseline dementia and is not very conversant but today she noticed a change in mental status.  She states that he is having less energy and is less responsive today.    She also states that she noticed the patient had a fever today.  She denies the patient complaining of any chest pain, abdominal pain, difficult urination.  However she does state that the patient has had a off for the last day.    Patient lives in a guest house behind the daughter's main house with the patient's wife.    Review of Systems   Unable to perform ROS: Dementia   All other systems reviewed and are negative.      Past Medical History:   Diagnosis Date   • Coronary artery disease    • Dementia    • DVT (deep venous thrombosis)    • Gout    • Hypertension    • Myocardial infarction        No Known Allergies    Past Surgical History:   Procedure Laterality Date   • APPENDECTOMY     • CORONARY ARTERY BYPASS GRAFT     • MASTOID SURGERY     • PACEMAKER IMPLANTATION         History reviewed. No pertinent family history.    Social History     Social History   • Marital status:      Social History Main Topics   • Smoking status: Never Smoker   • Alcohol use No   • Drug use: No   • Sexual activity: Defer     Other Topics Concern   • Not on file           Objective   Physical Exam   Constitutional: He appears well-developed.   Eyes: EOM are normal. Pupils are equal, round, and reactive to light.   Neck: Normal range of motion.   Cardiovascular: Normal rate, regular rhythm, normal heart sounds and intact distal pulses.  Exam reveals no gallop and no friction rub.    No murmur heard.  Pulmonary/Chest: Effort normal and breath sounds normal. No respiratory distress. He has no wheezes. He has no rales. He exhibits no tenderness.   Abdominal: Soft. He exhibits no distension and no mass.  There is no tenderness. There is no rebound and no guarding. No hernia.   Neurological:   Patient not responding to commands.  The daughter who is at the bedside, states that this is his baseline.   Vitals reviewed.      ECG 12 Lead    Date/Time: 3/22/2018 11:23 PM  Performed by: NOE COATES  Authorized by: NOE COATES   Comments: Sinus rhythm, rate 81, right bundle branch block, no acute ST-T changes               ED Course  ED Course   Comment By Time   The patient to be signed out to Dr. Marcum at shift change. Noe Coates MD 03/22 4164   CT chest: right upper lobe bronchiectasis and central peribronchial thicekning, slight process from previous exam. Mild micronodular opacities in the right upper lobe which could reprsent atpical infection. Non specifi opcity within the dependent aspects of the lower trachear towards the left iwh centrla chris. Could berepsent foal secrtions or aspirated material.  Helio Marcum MD 03/23 0220      CT Head Without Contrast   Final Result   Impression:     1. No acute intracranial process.   2. 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   3. Generalized white matter atrophy with prominence of the ventricles   and subarachnoid space.   This report was finalized on 03/22/2018 22:32 by Dr Alejandro Grimaldo, .      XR Chest 1 View   Final Result   1. Overall stable chest exam. No definite acute lung infiltrate.           This report was finalized on 03/22/2018 22:26 by Dr Alejandro Grimaldo, .      CT Chest Without Contrast    (Results Pending)     Labs Reviewed   COMPREHENSIVE METABOLIC PANEL - Abnormal; Notable for the following:        Result Value    Glucose 101 (*)     BUN 27 (*)     CO2 32.0 (*)     eGFR Non  Amer 55 (*)     All other components within normal limits    Narrative:     The MDRD GFR formula is only valid for adults with stable renal function between ages 18 and 70.   DIGOXIN LEVEL - Abnormal; Notable for the  following:     Digoxin <0.40 (*)     All other components within normal limits   CBC WITH AUTO DIFFERENTIAL - Abnormal; Notable for the following:     WBC 11.42 (*)     RBC 4.67 (*)     Lymphocyte % 13.8 (*)     All other components within normal limits   BLOOD GAS, VENOUS - Abnormal; Notable for the following:     pCO2, Venous 55.4 (*)     HCO3, Venous 31.1 (*)     Base Excess, Venous 4.0 (*)     All other components within normal limits   CALCIUM, IONIZED - Abnormal; Notable for the following:     Ionized Calcium 4.55 (*)     All other components within normal limits   INFLUENZA ANTIGEN, RAPID - Normal    Narrative:     Recommend confirmation of negative results by viral culture or molecular assay.   PROTIME-INR - Normal   MAGNESIUM - Normal   PHOSPHORUS - Normal   LACTIC ACID, PLASMA - Normal   URINALYSIS W/ CULTURE IF INDICATED - Normal    Narrative:     Urine microscopic not indicated.   PROCALCITONIN - Normal    Narrative:     SIRS, sepsis, severe sepsis, and septic shock are categorized according to the criteria of the consensus conference of the American College of Chest Physicians/Society of Critical Care Medicine.    PCT < 0.5 ng/mL     Systemic infection (sepsis) is not likely.    PCT >0.5 and < 2.0 ng/mL Systemic infection (sepsis) is possible, but other conditions are known to elevate PCT as well.    PCT > 2.0 ng/mL     Systemic infection (sepsis) is likely, unless other causes are known.      PCT > 10.0 ng/mL    Important systemic inflammatory response, almost exclusively due to severe bacterial sepsis or septic shock.    PCT values of < 0.5 ng/mL do not exclude an infection, because localized infections (without systemic signs) may be associated with such low concentrations, or a systemic infection in its initial stages (<6 hours).  Increased PCT can occur without infection.  PCT concentrations between 0.5 and 2.0 ng/mL should be interpreted taking into account the patients history.  It is recommended  to retest PCT within 6-24 hours if any concentrations < 2.0 ng/mL are obtained.   BLOOD CULTURE   BLOOD CULTURE   RAINBOW DRAW    Narrative:     The following orders were created for panel order [place] Draw.  Procedure                               Abnormality         Status                     ---------                               -----------         ------                     Light Blue Top[[id number]]                                   Final result               Green Top (Gel)[[id number]]                                  Final result               Lavender Top[[id number]]                                     Final result               Red Top[[id number]]                                          Final result                 Please view results for these tests on the individual orders.   CALCIUM, IONIZED   BLOOD GAS, VENOUS   CBC AND DIFFERENTIAL    Narrative:     The following orders were created for panel order CBC & Differential.  Procedure                               Abnormality         Status                     ---------                               -----------         ------                     CBC Auto Differential[[id number]]        Abnormal            Final result                 Please view results for these tests on the individual orders.   LIGHT BLUE TOP   GREEN TOP   LAVENDER TOP   RED TOP     Endrosed to me, waiting on studies. Ct shows likely pna. Daughter notes difficulty swallowing in the past. [facility] will treat and admit.            MDM    Final diagnoses:   Altered mental status, unspecified altered mental status type   Pneumonia of right lung due to infectious organism, unspecified part of lung            Helio Marcum MD  03/23/18 2911

## 2018-03-23 NOTE — PROGRESS NOTES
"Discharge Planning Assessment  [facility]     Patient Name: Chaim Mackey  MRN: [id number]  Today's Date: 3/23/2018    Admit Date: 3/22/2018          Discharge Needs Assessment     Row Name 03/23/18 1115       Living Environment    Lives With (P)  spouse    Name(s) of Who Lives With Patient (P)  Nely Mackey- Spouse    Current Living Arrangements (P)  home/apartment/condo    Primary Care Provided by (P)  other (see comments)   Private Pay Caregivers    Provides Primary Care For (P)  no one, unable/limited ability to care for self    Family Caregiver if Needed (P)  other (see comments)    Able to Return to Prior Arrangements (P)  yes       Transition Planning    Patient/Family Anticipates Transition to (P)  home    Transportation Anticipated (P)  public transportation;family or friend will provide    -[facility]        Discharge Needs Assessment    Readmission Within the Last 30 Days (P)  no previous admission in last 30 days    Equipment Currently Used at Home (P)  wheelchair    Anticipated Changes Related to Illness (P)  none    Equipment Needed After Discharge (P)  none    Discharge Facility/Level of Care Needs (P)  other (see comments)   Home with caregivers    Discharge Coordination/Progress (P)  Pt currently lives at home with spouse. Pt's dtr anticipates pt returning home at dc. SW student spoke to caregiver in the room for assessment because per caregiver the pt has not woken up. Pt does have a private caregiver 24/7. Spoke with dtr to assess for dc plan and she stated the pt should return home and she is interested in hospice for the pt. Dtr Brenna stated they have tried home health in the past but HH would dc pt due to non-compliance and \"he was not getting better\". JACQUI not seeing a qualifying hospice dx but will follow for other dc needs.            Discharge Plan    No documentation.       Destination     No service coordination in this encounter.      Durable Medical Equipment     No service coordination in " this encounter.      Dialysis/Infusion     No service coordination in this encounter.      Home Medical Care     No service coordination in this encounter.      Social Care     No service coordination in this encounter.                Demographic Summary    No documentation.           Functional Status    No documentation.           Psychosocial    No documentation.           Abuse/Neglect    No documentation.           Legal    No documentation.           Substance Abuse    No documentation.           Patient Forms    No documentation.         Mahogany Perez

## 2018-03-23 NOTE — PLAN OF CARE
Problem: Patient Care Overview  Goal: Plan of Care Review  Outcome: Ongoing (interventions implemented as appropriate)   03/23/18 0624   Coping/Psychosocial   Plan of Care Reviewed With daughter   Plan of Care Review   Progress no change   OTHER   Outcome Summary pt admitted from ED with AMS and PN; pt is very lethargic; pt has hx of dementia and his baseline is disoriented x4; pt has decreased appetite; pt has difficulty swallowing; speech consulted to do a swallow study; per daughter pt never c/o pain; will continue to monitor and note any changes     Goal: Discharge Needs Assessment  Outcome: Ongoing (interventions implemented as appropriate)      Problem: Skin Injury Risk (Adult)  Goal: Identify Related Risk Factors and Signs and Symptoms  Outcome: Outcome(s) achieved Date Met: 03/23/18 03/23/18 0624   Skin Injury Risk (Adult)   Related Risk Factors (Skin Injury Risk) advanced age;cognitive impairment;infection;mobility impaired;moisture     Goal: Skin Health and Integrity  Outcome: Ongoing (interventions implemented as appropriate)   03/23/18 0624   Skin Injury Risk (Adult)   Skin Health and Integrity making progress toward outcome       Problem: Confusion, Acute (Adult)  Goal: Identify Related Risk Factors and Signs and Symptoms  Outcome: Outcome(s) achieved Date Met: 03/23/18 03/23/18 0624   Confusion, Acute (Adult)   Related Risk Factors (Acute Confusion) advanced age;cognitive impairment;infection;mobility decreased   Signs and Symptoms (Acute Confusion) disorientation;memory disturbed;thought process diminished/disorganized     Goal: Cognitive/Functional Impairments Minimized  Outcome: Ongoing (interventions implemented as appropriate)   03/23/18 0624   Confusion, Acute (Adult)   Cognitive/Functional Impairments Minimized making progress toward outcome     Goal: Safety  Outcome: Ongoing (interventions implemented as appropriate)   03/23/18 0624   Confusion, Acute (Adult)   Safety making progress toward  outcome       Problem: Infection, Risk/Actual (Adult)  Goal: Identify Related Risk Factors and Signs and Symptoms  Outcome: Outcome(s) achieved Date Met: 03/23/18 03/23/18 0624   Infection, Risk/Actual (Adult)   Related Risk Factors (Infection, Risk/Actual) other (see comments)  (aspirates on food and drinks\)   Signs and Symptoms (Infection, Risk/Actual) body temperature changes;skin cool/clammy;lab value changes;mental/behavioral changes;weakness     Goal: Infection Prevention/Resolution  Outcome: Ongoing (interventions implemented as appropriate)   03/23/18 0624   Infection, Risk/Actual (Adult)   Infection Prevention/Resolution making progress toward outcome       Problem: Fall Risk (Adult)  Goal: Identify Related Risk Factors and Signs and Symptoms  Outcome: Outcome(s) achieved Date Met: 03/23/18 03/23/18 0624   Fall Risk (Adult)   Related Risk Factors (Fall Risk) age-related changes;confusion/agitation;fatigue/slow reaction;gait/mobility problems   Signs and Symptoms (Fall Risk) presence of risk factors     Goal: Absence of Fall  Outcome: Ongoing (interventions implemented as appropriate)   03/23/18 0624   Fall Risk (Adult)   Absence of Fall making progress toward outcome

## 2018-03-23 NOTE — PLAN OF CARE
Problem: Patient Care Overview  Goal: Plan of Care Review  Outcome: Ongoing (interventions implemented as appropriate)   03/23/18 1801   Coping/Psychosocial   Plan of Care Reviewed With caregiver;daughter   Plan of Care Review   Progress no change   OTHER   Outcome Summary pt reamains lethargic and does not awake this shift. Continues to be NPO. Continues on IV antibiotics. Saftey maintained.       Problem: Skin Injury Risk (Adult)  Goal: Skin Health and Integrity  Outcome: Ongoing (interventions implemented as appropriate)      Problem: Confusion, Acute (Adult)  Goal: Cognitive/Functional Impairments Minimized  Outcome: Ongoing (interventions implemented as appropriate)    Goal: Safety  Outcome: Ongoing (interventions implemented as appropriate)      Problem: Infection, Risk/Actual (Adult)  Goal: Infection Prevention/Resolution  Outcome: Ongoing (interventions implemented as appropriate)      Problem: Fall Risk (Adult)  Goal: Absence of Fall  Outcome: Ongoing (interventions implemented as appropriate)

## 2018-03-23 NOTE — H&P
[facility] Medicine Services  HISTORY AND PHYSICAL    Date of Admission: 3/22/2018  Primary Care Physician: Addi Bishop DO    Subjective     Chief Complaint: Confusion    History of Present Illness  Patient is a 89-year-old white male past medical history of dementia who presented to the emergency room with increasing confusion over the last 2 days.  Family states he has not been eating as much less energy and is also less responsive as well.  He is evaluating the emergency room today and found to have a right upper lobe infiltrate on examination.  Overall unable to get a clear history from him due to the fact that he was given 2 mg of Ativan in the emergency room prior to drawing blood so history was obtained from the daughter who is there the wife is gone home.  She states that he has had worsening problems recently.  He is also had problems in the past with swallowing and they were told that he needed to change his diet however they have not done that because he does not like the foods they have chosen.        Review of Systems   Unable to obtain review of systems from patient due to sedation  Otherwise complete ROS reviewed and negative except as mentioned in the HPI.    Past Medical History:   Past Medical History:   Diagnosis Date   • Coronary artery disease    • Dementia    • DVT (deep venous thrombosis)    • Gout    • Hypertension    • Myocardial infarction      Past Surgical History:  Past Surgical History:   Procedure Laterality Date   • APPENDECTOMY     • CORONARY ARTERY BYPASS GRAFT     • MASTOID SURGERY     • PACEMAKER IMPLANTATION       Social History:  reports that he has never smoked. He has never used smokeless tobacco. He reports that he does not drink alcohol or use drugs.    Family History: family history is not on file.   Negative for diabetes    Allergies:  No Known Allergies  Medications:  Prior to Admission medications    Medication Sig Start Date End  "Date Taking? Authorizing Provider   allopurinol (ZYLOPRIM) 100 MG tablet Take 200 mg by mouth Daily.   Yes Historical Provider, MD   aspirin 81 MG chewable tablet Chew 81 mg Daily.   Yes Historical Provider, MD   carvedilol (COREG) 12.5 MG tablet Take 12.5 mg by mouth 2 (Two) Times a Day With Meals.   Yes Historical Provider, MD   ipratropium-albuterol (DUO-NEB) 0.5-2.5 mg/mL nebulizer Take 3 mL by nebulization Every 4 (Four) Hours As Needed for Wheezing.   Yes Historical Provider, MD   memantine (NAMENDA XR) 28 MG capsule sustained-release 24 hr extended release capsule Take 28 mg by mouth Daily.   Yes Historical Provider, MD   niacin 100 MG tablet Take 100 mg by mouth Daily With Breakfast.   Yes Historical Provider, MD   OLANZapine (zyPREXA) 5 MG tablet Take 5 mg by mouth Every Night.   Yes Historical Provider, MD   omeprazole (priLOSEC) 20 MG capsule Take 20 mg by mouth Daily.   Yes Historical Provider, MD   digoxin (LANOXIN) 125 MCG tablet Take 125 mcg by mouth Daily.    Historical Provider, MD   ELIQUIS 5 MG tablet tablet TAKE 1 TABLET BY MOUTH TWICE A DAY 8/23/17   Scott Arvizu MD     Objective     Vital Signs: /62   Pulse 74   Temp 98 °F (36.7 °C) (Temporal Artery )   Resp 16   Ht 162.6 cm (64\")   Wt 63.5 kg (140 lb)   SpO2 95%   BMI 24.03 kg/m²   Physical Exam  Constitutional: He appears well-developed.   Eyes: EOM are normal. Pupils are equal, round, and reactive to light.   Neck: Normal range of motion.   Cardiovascular: Normal rate, regular rhythm, normal heart sounds and intact distal pulses.  Exam reveals no gallop and no friction rub.    No murmur heard.  Pulmonary/Chest: Effort normal and breath sounds normal. No respiratory distress. He has no wheezes. He has no rales. He exhibits no tenderness.   Abdominal: Soft. He exhibits no distension and no mass. There is no tenderness. There is no rebound and no guarding. No hernia.   Neurological:   Patient not responding to commands.  " The daughter who is at the bedside, states that this is his baseline.   Vitals reviewed.      Results Reviewed:  Lab Results (last 24 hours)     Procedure Component Value Units Date/Time    Digoxin Level [[id number]]  (Abnormal) Collected:  03/22/18 2358    Specimen:  Blood Updated:  03/23/18 0112     Digoxin <0.40 (L) ng/mL     [place] Draw [[id number]] Collected:  03/22/18 2358    Specimen:  Blood Updated:  03/23/18 0103    Narrative:       The following orders were created for panel order [place] Draw.  Procedure                               Abnormality         Status                     ---------                               -----------         ------                     Light Blue Top[[id number]]                                   Final result               Green Top (Gel)[[id number]]                                  Final result               Lavender Top[[id number]]                                     Final result               Red Top[[id number]]                                          Final result                 Please view results for these tests on the individual orders.    Light Blue Top [[id number]] Collected:  03/22/18 2358    Specimen:  Blood Updated:  03/23/18 0103     Extra Tube hold for add-on     Comment: Auto resulted       Green Top (Gel) [[id number]] Collected:  03/22/18 2358    Specimen:  Blood Updated:  03/23/18 0103     Extra Tube Hold for add-ons.     Comment: Auto resulted.       Lavender Top [[id number]] Collected:  03/22/18 2358    Specimen:  Blood Updated:  03/23/18 0103     Extra Tube hold for add-on     Comment: Auto resulted       Red Top [[id number]] Collected:  03/22/18 2358    Specimen:  Blood Updated:  03/23/18 0103     Extra Tube Hold for add-ons.     Comment: Auto resulted.       Procalcitonin [[id number]]  (Normal) Collected:  03/22/18 2358    Specimen:  Blood Updated:  03/23/18 0039     Procalcitonin <0.25 ng/mL     Narrative:       SIRS, sepsis, severe sepsis, and septic shock are  categorized according to the criteria of the consensus conference of the American College of Chest Physicians/Society of Critical Care Medicine.    PCT < 0.5 ng/mL     Systemic infection (sepsis) is not likely.    PCT >0.5 and < 2.0 ng/mL Systemic infection (sepsis) is possible, but other conditions are known to elevate PCT as well.    PCT > 2.0 ng/mL     Systemic infection (sepsis) is likely, unless other causes are known.      PCT > 10.0 ng/mL    Important systemic inflammatory response, almost exclusively due to severe bacterial sepsis or septic shock.    PCT values of < 0.5 ng/mL do not exclude an infection, because localized infections (without systemic signs) may be associated with such low concentrations, or a systemic infection in its initial stages (<6 hours).  Increased PCT can occur without infection.  PCT concentrations between 0.5 and 2.0 ng/mL should be interpreted taking into account the patients history.  It is recommended to retest PCT within 6-24 hours if any concentrations < 2.0 ng/mL are obtained.    Lactic Acid, Plasma [[id number]]  (Normal) Collected:  03/22/18 2358    Specimen:  Blood Updated:  03/23/18 0022     Lactate 1.9 mmol/L     Comprehensive Metabolic Panel [[id number]]  (Abnormal) Collected:  03/22/18 2358    Specimen:  Blood Updated:  03/23/18 0022     Glucose 101 (H) mg/dL      BUN 27 (H) mg/dL      Creatinine 1.24 mg/dL      Sodium 145 mmol/L      Potassium 4.2 mmol/L      Chloride 104 mmol/L      CO2 32.0 (H) mmol/L      Calcium 9.1 mg/dL      Total Protein 6.8 g/dL      Albumin 3.70 g/dL      ALT (SGPT) 21 U/L      AST (SGOT) 26 U/L      Alkaline Phosphatase 67 U/L      Total Bilirubin 0.7 mg/dL      eGFR Non African Amer 55 (L) mL/min/1.73      Globulin 3.1 gm/dL      A/G Ratio 1.2 g/dL      BUN/Creatinine Ratio 21.8     Anion Gap 9.0 mmol/L     Narrative:       The MDRD GFR formula is only valid for adults with stable renal function between ages 18 and 70.    Magnesium [[id number]]   (Normal) Collected:  03/22/18 2358    Specimen:  Blood Updated:  03/23/18 0022     Magnesium 2.0 mg/dL     Phosphorus [[id number]]  (Normal) Collected:  03/22/18 2358    Specimen:  Blood Updated:  03/23/18 0022     Phosphorus 3.5 mg/dL     Influenza Antigen, Rapid - Swab, Nasopharynx [[id number]]  (Normal) Collected:  03/23/18 0003    Specimen:  Swab from Nasopharynx Updated:  03/23/18 0019     Influenza A Ag, EIA Negative     Influenza B Ag, EIA Negative    Narrative:         Recommend confirmation of negative results by viral culture or molecular assay.    Blood Culture - Blood, [[id number]] Collected:  03/23/18 0010    Specimen:  Blood from Arm, Left Updated:  03/23/18 0017    Protime-INR [[id number]]  (Normal) Collected:  03/22/18 2358    Specimen:  Blood Updated:  03/23/18 0013     Protime 14.3 Seconds      INR 1.08    CBC & Differential [[id number]] Collected:  03/22/18 2358    Specimen:  Blood Updated:  03/23/18 0013    Narrative:       The following orders were created for panel order CBC & Differential.  Procedure                               Abnormality         Status                     ---------                               -----------         ------                     CBC Auto Differential[[id number]]        Abnormal            Final result                 Please view results for these tests on the individual orders.    CBC Auto Differential [[id number]]  (Abnormal) Collected:  03/22/18 2358    Specimen:  Blood Updated:  03/23/18 0013     WBC 11.42 (H) 10*3/mm3      RBC 4.67 (L) 10*6/mm3      Hemoglobin 14.0 g/dL      Hematocrit 42.0 %      MCV 89.9 fL      MCH 30.0 pg      MCHC 33.3 g/dL      RDW 13.9 %      RDW-SD 45.6 fl      MPV 9.7 fL      Platelets 195 10*3/mm3      Neutrophil % 72.6 %      Lymphocyte % 13.8 (L) %      Monocyte % 10.3 %      Eosinophil % 2.6 %      Basophil % 0.4 %      Immature Grans % 0.3 %      Neutrophils, Absolute 8.28 10*3/mm3      Lymphocytes, Absolute 1.58 10*3/mm3       Monocytes, Absolute 1.18 10*3/mm3      Eosinophils, Absolute 0.30 10*3/mm3      Basophils, Absolute 0.05 10*3/mm3      Immature Grans, Absolute 0.03 10*3/mm3      nRBC 0.0 /100 WBC     Blood Culture - Blood, [[id number]] Collected:  03/22/18 2350    Specimen:  Blood from Arm, Right Updated:  03/23/18 0003    Calcium, Ionized [[id number]]  (Abnormal) Collected:  03/22/18 2350    Specimen:  Blood Updated:  03/23/18 0000     Ionized Calcium 4.55 (L) mg/dL      Collected by [id number]    Blood Gas, Venous [[id number]]  (Abnormal) Collected:  03/22/18 2350    Specimen:  Venous Blood Updated:  03/22/18 2359     Site OTHER     pH, Venous 7.358 pH Units      pCO2, Venous 55.4 (H) mm Hg      pO2, Venous 28.7 mm Hg      HCO3, Venous 31.1 (H) mmol/L      Base Excess, Venous 4.0 (H) mmol/L      O2 Saturation, Venous 54.1 %      Temperature 37.0 C      Barometric Pressure for Blood Gas 759 mmHg      Modality Room Air     Ventilator Mode NA     Collected by [id number]    Urinalysis With / Culture If Indicated - Urine, Catheter [[id number]]  (Normal) Collected:  03/22/18 2245    Specimen:  Urine from Urine, Catheter Updated:  03/22/18 2253     Color, UA Yellow     Appearance, UA Clear     pH, UA 5.5     Specific Gravity, UA 1.020     Glucose, UA Negative     Ketones, UA Negative     Bilirubin, UA Negative     Blood, UA Negative     Protein, UA Negative     Leuk Esterase, UA Negative     Nitrite, UA Negative     Urobilinogen, UA 1.0 E.U./dL    Narrative:       Urine microscopic not indicated.        Imaging Results (last 24 hours)     Procedure Component Value Units Date/Time    CT Chest Without Contrast [[id number]] Updated:  03/23/18 0102    CT Head Without Contrast [[id number]] Collected:  03/22/18 2227     Updated:  03/22/18 2235    Narrative:       CT HEAD WO CONTRAST- 3/22/2018 10:02 PM CDT     HISTORY: AMS       DOSE LENGTH PRODUCT: 1107 mGy cm. Automated exposure control was also  utilized to decrease patient radiation dose.      Technique:   Axial CT of the brain without IV contrast. Sagittal and coronal  reformations are also provided for review. Soft tissue and bone kernels  are available for interpretation.     Comparison: CT scan dated 6/14/2016.     Findings:      Interval right PCA infarct with encephalomalacia. There is no evidence  of acute large vascular distribution infarct, mass lesion or hemorrhage.   There is generalized white matter atrophy with prominence of the  subarachnoid space as well as ventriculomegaly, not significantly  changed from the prior exam. Bilateral cerebellar volume loss which is  stable. The scalp and calvarium are unremarkable. Visualized paranasal  sinuses and mastoids are clear.        Impression:       Impression:    1. No acute intracranial process.  2. 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  3. Generalized white matter atrophy with prominence of the ventricles  and subarachnoid space.  This report was finalized on 03/22/2018 22:32 by Dr Alejandro Grimaldo, .    XR Chest 1 View [[id number]] Collected:  03/22/18 2225     Updated:  03/22/18 2229    Narrative:       XR CHEST 1 VW- 3/22/2018 9:38 PM CDT     HISTORY: Severe sepsis protocol       COMPARISON: Exam dated 5/13/2017.     FINDINGS:   Low lung volumes. No definite focal infiltrate. Stable heart size with  normal appearance of the pulmonary vasculature. Prior coronary bypass.  Reidentified chest wall pacer.     The osseous structures and surrounding soft tissues demonstrate no acute  abnormality.       Impression:       1. Overall stable chest exam. No definite acute lung infiltrate.        This report was finalized on 03/22/2018 22:26 by Dr Alejandro Grimaldo, .        I have personally reviewed and interpreted the radiology studies and ECG obtained at time of admission.     Assessment / Plan     Assessment:   Hospital Problem List     Altered mental status      1.  Encephalopathy metabolic due to pneumonia.  Plan is to admit patient  hydrate with IV fluids and monitor his electrolytes labs treat pneumonia as described below we will also get physical therapy to evaluate and help with ambulation.  2.  Aspiration pneumonia presumed anyway plan is start patient on clindamycin and Rocephin consult speech pathology the family states that he has been mentioned before in the past needed to change his diet in order to swallow better but they did not follow through with it.  They said because he is a picky eater.  We will follow with speech therapy's recommendations.  3.  Dementia continue current medications suspect that his infection is the reason for his worsening of confusion.  No further workup noted      Patient seen after midnight         Code Status: Conditional     I discussed the patients findings and my recommendations with the patient and his daughter who is his healthcare power surrogate along with the patient's wife    Estimated length of stay 2-3 days    Cristofer Collier MD   03/23/18   4:19 AM

## 2018-03-23 NOTE — PLAN OF CARE
Problem: Patient Care Overview  Goal: Plan of Care Review  Outcome: Ongoing (interventions implemented as appropriate)   03/23/18 6992   Coping/Psychosocial   Plan of Care Reviewed With caregiver   OTHER   Outcome Summary CBSE completed. Attempted to see pt for bedside swallow eval. Nursing reported pt had been lethargic and unable to awaken since yesterday. SLP attempted to wake pt, but he did not repond to verbal or tactile cues. Chart review reveals that the pt has a history of dysphagia, but family told staff they did not follow through with diet modifications due to pt's dislike of the recommended consistencies. A caregiver was present in the pt's room, but is unaware if the pt or family would ever wish to pursue MEE if needed. Educated pt's caregiver that SLP recommends the pt remain NPO until he is alert enough to be re-assessed. Recommend meds alternate route at this time. SLP will re-assess when pt more alert.

## 2018-03-23 NOTE — PROGRESS NOTES
[facility] Medicine Services  INPATIENT PROGRESS NOTE    Length of Stay: 0  Date of Admission: 3/22/2018  Primary Care Physician: Addi Bishop DO    Subjective   Chief Complaint: Altered mental status/pneumonia    HPI   Patient is very sedated from Ativan.  MAXIMUM TEMPERATURE 100.1.  CURRENT TEMPERATURE 99.3.  Blood pressure stable.    Review of Systems   Unable to assess secondary to the patient's sedated state.     All pertinent negatives and positives are as above. All other systems have been reviewed and are negative unless otherwise stated.     Objective    Temp:  [98 °F (36.7 °C)-100.1 °F (37.8 °C)] 99.3 °F (37.4 °C)  Heart Rate:  [72-85] 72  Resp:  [16-20] 16  BP: (112-164)/(53-91) 123/55    Intake/Output Summary (Last 24 hours) at 03/23/18 1203  Last data filed at 03/23/18 0556   Gross per 24 hour   Intake              270 ml   Output                0 ml   Net              270 ml     Physical Exam   Constitutional: He appears well-developed.   HENT:   Head: Normocephalic and atraumatic.   Eyes: Conjunctivae are normal. Pupils are equal, round, and reactive to light.   Neck: Neck supple. No JVD present. No thyromegaly present.   Cardiovascular: Normal rate, regular rhythm, normal heart sounds and intact distal pulses.  Exam reveals no gallop and no friction rub.    No murmur heard.  Pulmonary/Chest: No respiratory distress. He has no wheezes. He has no rales. He exhibits no tenderness.   Diminished breath sound   Abdominal: Soft. Bowel sounds are normal. He exhibits no distension. There is no tenderness. There is no rebound and no guarding.   Musculoskeletal: He exhibits no edema, tenderness or deformity.   Lymphadenopathy:     He has no cervical adenopathy.   Neurological: He displays normal reflexes. No cranial nerve deficit. He exhibits abnormal muscle tone. Coordination abnormal.   Skin: Skin is warm and dry. No rash noted.   Nursing note and vitals  reviewed.      Results Review:  Lab Results (last 24 hours)     Procedure Component Value Units Date/Time    Blood Culture - Blood, [[id number]] Collected:  03/23/18 0643    Specimen:  Blood from Arm, Left Updated:  03/23/18 0851    Blood Culture - Blood, [[id number]] Collected:  03/23/18 0643    Specimen:  Blood from Arm, Left Updated:  03/23/18 0851    Comprehensive Metabolic Panel [[id number]]  (Abnormal) Collected:  03/23/18 0643    Specimen:  Blood Updated:  03/23/18 0712     Glucose 111 (H) mg/dL      BUN 27 (H) mg/dL      Creatinine 1.16 mg/dL      Sodium 146 (H) mmol/L      Potassium 4.1 mmol/L      Chloride 105 mmol/L      CO2 31.0 mmol/L      Calcium 8.9 mg/dL      Total Protein 6.8 g/dL      Albumin 3.50 g/dL      ALT (SGPT) 18 U/L      AST (SGOT) 30 U/L      Alkaline Phosphatase 65 U/L      Total Bilirubin 0.7 mg/dL      eGFR Non African Amer 59 (L) mL/min/1.73      Globulin 3.3 gm/dL      A/G Ratio 1.1 g/dL      BUN/Creatinine Ratio 23.3     Anion Gap 10.0 mmol/L     Narrative:       The MDRD GFR formula is only valid for adults with stable renal function between ages 18 and 70.    CBC Auto Differential [[id number]]  (Abnormal) Collected:  03/23/18 0643    Specimen:  Blood Updated:  03/23/18 0659     WBC 11.96 (H) 10*3/mm3      RBC 4.87 10*6/mm3      Hemoglobin 14.3 g/dL      Hematocrit 43.3 %      MCV 88.9 fL      MCH 29.4 pg      MCHC 33.0 g/dL      RDW 13.9 %      RDW-SD 45.3 fl      MPV 9.5 fL      Platelets 182 10*3/mm3      Neutrophil % 73.3 %      Lymphocyte % 13.8 (L) %      Monocyte % 9.0 %      Eosinophil % 2.8 %      Basophil % 0.5 %      Immature Grans % 0.6 %      Neutrophils, Absolute 8.76 (H) 10*3/mm3      Lymphocytes, Absolute 1.65 10*3/mm3      Monocytes, Absolute 1.08 10*3/mm3      Eosinophils, Absolute 0.34 10*3/mm3      Basophils, Absolute 0.06 10*3/mm3      Immature Grans, Absolute 0.07 (H) 10*3/mm3      nRBC 0.0 /100 WBC     Digoxin Level [[id number]]  (Abnormal) Collected:  03/22/18  2358    Specimen:  Blood Updated:  03/23/18 0112     Digoxin <0.40 (L) ng/mL     [place] Draw [[id number]] Collected:  03/22/18 2358    Specimen:  Blood Updated:  03/23/18 0103    Narrative:       The following orders were created for panel order [place] Draw.  Procedure                               Abnormality         Status                     ---------                               -----------         ------                     Light Blue Top[[id number]]                                   Final result               Green Top (Gel)[[id number]]                                  Final result               Lavender Top[[id number]]                                     Final result               Red Top[[id number]]                                          Final result                 Please view results for these tests on the individual orders.    Light Blue Top [[id number]] Collected:  03/22/18 2358    Specimen:  Blood Updated:  03/23/18 0103     Extra Tube hold for add-on     Comment: Auto resulted       Green Top (Gel) [[id number]] Collected:  03/22/18 2358    Specimen:  Blood Updated:  03/23/18 0103     Extra Tube Hold for add-ons.     Comment: Auto resulted.       Lavender Top [[id number]] Collected:  03/22/18 2358    Specimen:  Blood Updated:  03/23/18 0103     Extra Tube hold for add-on     Comment: Auto resulted       Red Top [[id number]] Collected:  03/22/18 2358    Specimen:  Blood Updated:  03/23/18 0103     Extra Tube Hold for add-ons.     Comment: Auto resulted.       Procalcitonin [[id number]]  (Normal) Collected:  03/22/18 2358    Specimen:  Blood Updated:  03/23/18 0039     Procalcitonin <0.25 ng/mL     Narrative:       SIRS, sepsis, severe sepsis, and septic shock are categorized according to the criteria of the consensus conference of the American College of Chest Physicians/Society of Critical Care Medicine.    PCT < 0.5 ng/mL     Systemic infection (sepsis) is not likely.    PCT >0.5 and < 2.0 ng/mL Systemic  infection (sepsis) is possible, but other conditions are known to elevate PCT as well.    PCT > 2.0 ng/mL     Systemic infection (sepsis) is likely, unless other causes are known.      PCT > 10.0 ng/mL    Important systemic inflammatory response, almost exclusively due to severe bacterial sepsis or septic shock.    PCT values of < 0.5 ng/mL do not exclude an infection, because localized infections (without systemic signs) may be associated with such low concentrations, or a systemic infection in its initial stages (<6 hours).  Increased PCT can occur without infection.  PCT concentrations between 0.5 and 2.0 ng/mL should be interpreted taking into account the patients history.  It is recommended to retest PCT within 6-24 hours if any concentrations < 2.0 ng/mL are obtained.    Lactic Acid, Plasma [[id number]]  (Normal) Collected:  03/22/18 2358    Specimen:  Blood Updated:  03/23/18 0022     Lactate 1.9 mmol/L     Comprehensive Metabolic Panel [[id number]]  (Abnormal) Collected:  03/22/18 2358    Specimen:  Blood Updated:  03/23/18 0022     Glucose 101 (H) mg/dL      BUN 27 (H) mg/dL      Creatinine 1.24 mg/dL      Sodium 145 mmol/L      Potassium 4.2 mmol/L      Chloride 104 mmol/L      CO2 32.0 (H) mmol/L      Calcium 9.1 mg/dL      Total Protein 6.8 g/dL      Albumin 3.70 g/dL      ALT (SGPT) 21 U/L      AST (SGOT) 26 U/L      Alkaline Phosphatase 67 U/L      Total Bilirubin 0.7 mg/dL      eGFR Non African Amer 55 (L) mL/min/1.73      Globulin 3.1 gm/dL      A/G Ratio 1.2 g/dL      BUN/Creatinine Ratio 21.8     Anion Gap 9.0 mmol/L     Narrative:       The MDRD GFR formula is only valid for adults with stable renal function between ages 18 and 70.    Magnesium [[id number]]  (Normal) Collected:  03/22/18 2358    Specimen:  Blood Updated:  03/23/18 0022     Magnesium 2.0 mg/dL     Phosphorus [[id number]]  (Normal) Collected:  03/22/18 2358    Specimen:  Blood Updated:  03/23/18 0022     Phosphorus 3.5 mg/dL      Influenza Antigen, Rapid - Swab, Nasopharynx [[id number]]  (Normal) Collected:  03/23/18 0003    Specimen:  Swab from Nasopharynx Updated:  03/23/18 0019     Influenza A Ag, EIA Negative     Influenza B Ag, EIA Negative    Narrative:         Recommend confirmation of negative results by viral culture or molecular assay.    Blood Culture - Blood, [[id number]] Collected:  03/23/18 0010    Specimen:  Blood from Arm, Left Updated:  03/23/18 0017    Protime-INR [[id number]]  (Normal) Collected:  03/22/18 2358    Specimen:  Blood Updated:  03/23/18 0013     Protime 14.3 Seconds      INR 1.08    CBC & Differential [[id number]] Collected:  03/22/18 2358    Specimen:  Blood Updated:  03/23/18 0013    Narrative:       The following orders were created for panel order CBC & Differential.  Procedure                               Abnormality         Status                     ---------                               -----------         ------                     CBC Auto Differential[[id number]]        Abnormal            Final result                 Please view results for these tests on the individual orders.    CBC Auto Differential [[id number]]  (Abnormal) Collected:  03/22/18 2358    Specimen:  Blood Updated:  03/23/18 0013     WBC 11.42 (H) 10*3/mm3      RBC 4.67 (L) 10*6/mm3      Hemoglobin 14.0 g/dL      Hematocrit 42.0 %      MCV 89.9 fL      MCH 30.0 pg      MCHC 33.3 g/dL      RDW 13.9 %      RDW-SD 45.6 fl      MPV 9.7 fL      Platelets 195 10*3/mm3      Neutrophil % 72.6 %      Lymphocyte % 13.8 (L) %      Monocyte % 10.3 %      Eosinophil % 2.6 %      Basophil % 0.4 %      Immature Grans % 0.3 %      Neutrophils, Absolute 8.28 10*3/mm3      Lymphocytes, Absolute 1.58 10*3/mm3      Monocytes, Absolute 1.18 10*3/mm3      Eosinophils, Absolute 0.30 10*3/mm3      Basophils, Absolute 0.05 10*3/mm3      Immature Grans, Absolute 0.03 10*3/mm3      nRBC 0.0 /100 WBC     Blood Culture - Blood, [[id number]] Collected:  03/22/18 2350     Specimen:  Blood from Arm, Right Updated:  03/23/18 0003    Calcium, Ionized [[id number]]  (Abnormal) Collected:  03/22/18 2350    Specimen:  Blood Updated:  03/23/18 0000     Ionized Calcium 4.55 (L) mg/dL      Collected by [id number]    Blood Gas, Venous [[id number]]  (Abnormal) Collected:  03/22/18 2350    Specimen:  Venous Blood Updated:  03/22/18 2359     Site OTHER     pH, Venous 7.358 pH Units      pCO2, Venous 55.4 (H) mm Hg      pO2, Venous 28.7 mm Hg      HCO3, Venous 31.1 (H) mmol/L      Base Excess, Venous 4.0 (H) mmol/L      O2 Saturation, Venous 54.1 %      Temperature 37.0 C      Barometric Pressure for Blood Gas 759 mmHg      Modality Room Air     Ventilator Mode NA     Collected by [id number]    Urinalysis With / Culture If Indicated - Urine, Catheter [[id number]]  (Normal) Collected:  03/22/18 2245    Specimen:  Urine from Urine, Catheter Updated:  03/22/18 2253     Color, UA Yellow     Appearance, UA Clear     pH, UA 5.5     Specific Gravity, UA 1.020     Glucose, UA Negative     Ketones, UA Negative     Bilirubin, UA Negative     Blood, UA Negative     Protein, UA Negative     Leuk Esterase, UA Negative     Nitrite, UA Negative     Urobilinogen, UA 1.0 E.U./dL    Narrative:       Urine microscopic not indicated.           Cultures:       Radiology Data:    Imaging Results (last 24 hours)     Procedure Component Value Units Date/Time    CT Chest Without Contrast [[id number]] Collected:  03/23/18 0818     Updated:  03/23/18 0838    Narrative:       EXAMINATION:  CT CHEST WO CONTRAST-  3/23/2018 12:48 AM CDT     HISTORY: Coughing and fever. Altered mental status. White blood cell  count of 11,420.     COMPARISON : 9/9/2015.     DLP: 443 mGy-cm. Automated dosage control was utilized.     TECHNIQUE: Spiral CT was performed of the chest without contrast.  Sagittal and coronal images were reconstructed.     MEDIASTINUM, HEART AND VASCULAR STRUCTURES: There is atheromatous  calcification of the thoracic aorta and  coronary arteries. There has  been prior heart bypass surgery. The pulmonary arteries are dilated  measuring 3 cm on the left and 3.3 cm on the right. There is mild  cardiomegaly. There is a small hiatal hernia.     LUNGS: There is bronchiectasis in the right upper lobe. There is mild  patchy infiltrate in both upper lobes right greater than left. This is  seen primarily in the lung apices. There is dependent infiltrate in the  posterior and inferior lung bases most likely due to atelectasis. There  is no significant pleural effusion.     UPPER ABDOMEN: There is a 3.1 cm left hepatic lobe cyst. There is  moderate to large stool in the visualized colon. Images of the abdomen  and abdomen are otherwise unremarkable.     BONES: There are degenerative changes of the spine.       Impression:       1. 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  Prior heart bypass surgery. Dilated pulmonary arteries consistent with  pulmonary hypertension.  2. Bronchiectasis in the right upper lobe. Patchy infiltrates in both  lung apices right greater than left. This is likely infectious or  inflammatory. There is dependent atelectasis in the lung bases.  3. Small hiatal hernia. There is a 3.1 cm left hepatic lobe cyst. There  is moderate to large stool in the visualized colon.          This report was finalized on 03/23/2018 08:35 by Dr. Yrn Ordoñez MD.    CT Head Without Contrast [[id number]] Collected:  03/22/18 2227     Updated:  03/22/18 2235    Narrative:       CT HEAD WO CONTRAST- 3/22/2018 10:02 PM CDT     HISTORY: AMS       DOSE LENGTH PRODUCT: 1107 mGy cm. Automated exposure control was also  utilized to decrease patient radiation dose.     Technique:   Axial CT of the brain without IV contrast. Sagittal and coronal  reformations are also provided for review. Soft tissue and bone kernels  are available for interpretation.     Comparison: CT scan dated 6/14/2016.     Findings:      Interval right PCA  infarct with encephalomalacia. There is no evidence  of acute large vascular distribution infarct, mass lesion or hemorrhage.   There is generalized white matter atrophy with prominence of the  subarachnoid space as well as ventriculomegaly, not significantly  changed from the prior exam. Bilateral cerebellar volume loss which is  stable. The scalp and calvarium are unremarkable. Visualized paranasal  sinuses and mastoids are clear.        Impression:       Impression:    1. No acute intracranial process.  2. 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  3. Generalized white matter atrophy with prominence of the ventricles  and subarachnoid space.  This report was finalized on 03/22/2018 22:32 by Dr Alejandro Grimaldo, .    XR Chest 1 View [[id number]] Collected:  03/22/18 2225     Updated:  03/22/18 2229    Narrative:       XR CHEST 1 VW- 3/22/2018 9:38 PM CDT     HISTORY: Severe sepsis protocol       COMPARISON: Exam dated 5/13/2017.     FINDINGS:   Low lung volumes. No definite focal infiltrate. Stable heart size with  normal appearance of the pulmonary vasculature. Prior coronary bypass.  Reidentified chest wall pacer.     The osseous structures and surrounding soft tissues demonstrate no acute  abnormality.       Impression:       1. Overall stable chest exam. No definite acute lung infiltrate.        This report was finalized on 03/22/2018 22:26 by Dr Alejandro Grimaldo, .          No Known Allergies    Scheduled meds:     allopurinol 200 mg Oral Daily   apixaban 5 mg Oral BID   aspirin 81 mg Oral Daily   carvedilol 12.5 mg Oral BID With Meals   [START ON 3/24/2018] ceftriaxone 1 g Intravenous Q24H   clindamycin 900 mg Intravenous Q8H   digoxin 125 mcg Oral Daily   memantine 10 mg Oral Q12H   OLANZapine 5 mg Oral Nightly   pantoprazole 40 mg Oral QAM       PRN meds:  •  acetaminophen  •  influenza vaccine  •  ipratropium-albuterol  •  ondansetron  •  sodium chloride  •  sodium chloride    Assessment/Plan      Active Problems:    Altered mental status    Pneumonia    Dementia      Plan:  Altered mental status/metabolic encephalopathy    Pneumonia- continue clindamycin and rocephin.  Continue duo nebs.    Hypernatremia-half-normal saline.    Endstage Alzheimer Dementia    History of CAD-  Hold all by mouth medication due to metabolic encephalopathy.  Echocardiogram.    Hypertension-Lopressor when necessary    Nutrition-unable to take anything by mouth due to altered mental status.  Speech evaluate.  Banana bag.    Deconditioning - PT consult.    Discharge Planning:  unknown    Manuel Ingram MD   03/23/18   12:03 PM

## 2018-03-23 NOTE — PROGRESS NOTES
Continued Stay Note   [place]     Patient Name: Chaim Mackey  MRN: [id number]  Today's Date: 3/23/2018    Admit Date: 3/22/2018          Discharge Plan     Row Name 03/23/18 1331       Plan    Plan Pt is [facility] member of [id number]  May contact navigators at [phone] or [phone] for any discharge needs.    Patient/Family in Agreement with Plan yes    Plan Comments Visit with pt. and liaison notifed of patient's admission.  Will cont to follow.              Discharge Codes    No documentation.           Carol Marques RN

## 2018-03-23 NOTE — THERAPY EVALUATION
Acute Care - Speech Language Pathology   Swallow Initial Evaluation [facility]     Patient Name: Chaim Mackey  : 10/16/1927  MRN: [id number]  Today's Date: 3/23/2018               Admit Date: 3/22/2018  CBSE completed. Attempted to see pt for bedside swallow eval. Nursing reported pt had been lethargic and unable to awaken since yesterday. SLP attempted to wake pt, but he did not repond to verbal or tactile cues. Chart review reveals that the pt has a history of dysphagia, but family told staff they did not follow through with diet modifications due to pt's dislike of the recommended consistencies. A caregiver was present in the pt's room, but is unaware if the pt or family would ever wish to pursue MEE if needed. Educated pt's caregiver that SLP recommends the pt remain NPO until he is alert enough to be re-assessed. Recommend meds alternate route at this time. SLP will re-assess when pt more alert.  Ronel Gonzalez, JENNIFER-SLP 3/23/2018 9:44 AM    Visit Dx:     ICD-10-CM ICD-9-CM   1. Altered mental status, unspecified altered mental status type R41.82 780.97   2. Pneumonia of right lung due to infectious organism, unspecified part of lung J18.9 483.8   3. Dysphagia, unspecified type R13.10 787.20     Patient Active Problem List   Diagnosis   • Febrile illness   • Viral syndrome   • Systemic inflammatory response syndrome   • Altered mental status     Past Medical History:   Diagnosis Date   • Coronary artery disease    • Dementia    • DVT (deep venous thrombosis)    • Gout    • Hypertension    • Myocardial infarction      Past Surgical History:   Procedure Laterality Date   • APPENDECTOMY     • CORONARY ARTERY BYPASS GRAFT     • MASTOID SURGERY     • PACEMAKER IMPLANTATION            SWALLOW EVALUATION (last 72 hours)      SLP Adult Swallow Evaluation     Row Name 18 0850                   Rehab Evaluation    Document Type evaluation  -MB        Subjective Information --   unable to respond  -MB            General Information    Patient Profile Reviewed yes  -MB        Pertinent History Of Current Problem dementia, CAD, DVT, HTN, MI, CABG, mastoid surgery, pacemaker  -MB        Current Method of Nutrition NPO  -MB        Precautions/Limitations, Vision other (see comments)   CNA  -MB        Precautions/Limitations, Hearing other (see comments)   CNA  -MB        Prior Level of Function-Communication cognitive-linguistic impairment  -MB        Prior Level of Function-Swallowing no diet consistency restrictions;other (see comments)   however modifications have been recommended in the past  -MB        Plans/Goals Discussed with other (see comments)   caregiver  -MB        Barriers to Rehab cognitive status  -MB        Patient's Goals for Discharge patient could not state  -MB           Pain Assessment    Additional Documentation Pain Scale: FACES Pre/Post-Treatment (Group)  -MB           Pain Scale: FACES Pre/Post-Treatment    Pain: FACES Scale, Pretreatment 2-->hurts little bit  -MB           Oral Motor and Function    Dentition Assessment missing teeth;poor oral hygiene  -MB        Secretion Management WNL/WFL  -MB        Mucosal Quality moist, healthy  -MB        Volitional Swallow other (see comments)   CNA  -MB        Volitional Cough other (see comments)   CNA  -MB           Oral Musculature and Cranial Nerve Assessment    Oral Motor General Assessment unable to assess  -MB           General Eating/Swallowing Observations    Respiratory Support Currently in Use nasal cannula  -MB        Eating/Swallowing Skills other (see comments)   No PO trials attempted  -MB        Positioning During Eating other (see comments)   No PO trials presented  -MB           Clinical Swallow Eval    Clinical Swallow Evaluation Summary Attempted to see pt for bedside swallow eval. Nursing reported pt had been lethargic and unable to awaken since yesterday. SLP attempted to wake pt, but he did not repond to verbal or tactile cues.  Chart review reveals that the pt has a history of dysphagia, but family told staff they did not follow through with diet modifications due to pt's dislike of the recommended consistencies. A caregiver was present in the pt's room, but is unaware if the pt or family would ever wish to pursue MEE if needed. Educated pt's caregiver that SLP recommends the pt remain NPO until he is alert enough to be re-assessed.  -MB           Clinical Impression    SLP Swallowing Diagnosis suspected pharyngeal dysfunction  -MB        Functional Impact risk of aspiration/pneumonia;risk of malnutrition;risk of dehydration  -MB        Rehab Potential/Prognosis, Swallowing adequate, monitor progress closely  -MB        Criteria for Skilled Therapeutic Interventions Met demonstrates skilled criteria  -MB           Recommendations    Therapy Frequency (SLP) 3 days per week  -MB        Predicted Duration Therapy Intervention (Days) until discharge  -MB        SLP Diet Recommendation NPO  -MB        Recommended Diagnostics VFSS (MBS);other (see comments)   if alert enough  -MB        SLP Rec. for Method of Medication Administration meds via alternate route  -MB        Anticipated Dischage Disposition skilled nursing facility  -MB        Demonstrates Need for Referral to Another Service clinical nutrition services/dietitian  -MB           Swallow Goals (SLP)    Oral Nutrition/Hydration Goal Selection (SLP) oral nutrition/hydration, SLP goal 1  -MB           Oral Nutrition/Hydration Goal 1 (SLP)    Oral Nutrition/Hydration Goal 1, SLP Pt will tolerate least restricitve diet with no overt s/s of aspiration.  -MB        Time Frame (Oral Nutrition/Hydration Goal 1, SLP) by discharge  -MB        Barriers (Oral Nutrition/Hydration Goal 1, SLP) n/a  -MB        Progress/Outcomes (Oral Nutrition/Hydration Goal 1, SLP) goal ongoing  -MB          User Key  (r) = Recorded By, (t) = Taken By, (c) = Cosigned By    Initials Name Effective Dates    MB  Ronel Gonzalez CCC-SLP 08/02/16 -         EDUCATION  The patient has been educated in the following areas:   Dysphagia (Swallowing Impairment).    SLP Recommendation and Plan  SLP Swallowing Diagnosis: suspected pharyngeal dysfunction  SLP Diet Recommendation: NPO           Recommended Diagnostics: VFSS (MBS), other (see comments) (if alert enough)  Criteria for Skilled Therapeutic Interventions Met: demonstrates skilled criteria  Anticipated Dischage Disposition: skilled nursing facility  Rehab Potential/Prognosis, Swallowing: adequate, monitor progress closely  Therapy Frequency (SLP): 3 days per week  Predicted Duration Therapy Intervention (Days): until discharge  Demonstrates Need for Referral to Another Service: clinical nutrition services/dietitian    Plan of Care Reviewed With: caregiver  Plan of Care Review  Plan of Care Reviewed With: caregiver  Outcome Summary: CBSE completed. Attempted to see pt for bedside swallow eval. Nursing reported pt had been lethargic and unable to awaken since yesterday. SLP attempted to wake pt, but he did not repond to verbal or tactile cues. Chart review reveals that the pt has a history of dysphagia, but family told staff they did not follow through with diet modifications due to pt's dislike of the recommended consistencies. A caregiver was present in the pt's room, but is unaware if the pt or family would ever wish to pursue MEE if needed. Educated pt's caregiver that SLP recommends the pt remain NPO until he is alert enough to be re-assessed. Recommend meds alternate route at this time. SLP will re-assess when pt more alert.           SLP GOALS     Row Name 03/23/18 0850             Oral Nutrition/Hydration Goal 1 (SLP)    Oral Nutrition/Hydration Goal 1, SLP Pt will tolerate least restricitve diet with no overt s/s of aspiration.  -MB      Time Frame (Oral Nutrition/Hydration Goal 1, SLP) by discharge  -MB      Barriers (Oral Nutrition/Hydration Goal 1, SLP) n/a   -MB      Progress/Outcomes (Oral Nutrition/Hydration Goal 1, SLP) goal ongoing  -MB        User Key  (r) = Recorded By, (t) = Taken By, (c) = Cosigned By    Initials Name Provider Type    MARISOL Porter Speech and Language Pathologist             SLP Outcome Measures (last 72 hours)      SLP Outcome Measures     Row Name 03/23/18 0900             SLP Outcome Measures    Outcome Measure Used? Adult NOMS  -MB         FCM Scores    FCM Chosen Swallowing  -MB      Swallowing FCM Score 1  -MB        User Key  (r) = Recorded By, (t) = Taken By, (c) = Cosigned By    Initials Name Effective Dates    MARISOL Porter 08/02/16 -            Time Calculation:         Time Calculation- SLP     Row Name 03/23/18 0943             Time Calculation- SLP    SLP Start Time 0850  -MB      SLP Stop Time 0943  -MB      SLP Time Calculation (min) 53 min  -MB      SLP Received On 03/23/18  -MB      SLP Goal Re-Cert Due Date 04/02/18  -MB        User Key  (r) = Recorded By, (t) = Taken By, (c) = Cosigned By    Initials Name Provider Type    MARISOL Porter Speech and Language Pathologist          Therapy Charges for Today     Code Description Service Date Service Provider Modifiers Qty    50587862812 HC ST SWALLOWING CURRENT STATUS 3/23/2018 MARISOL Lopez GN, CN 1    [id number] HC ST SWALLOWING PROJECTED 3/23/2018 MARISOL Lopez GN, CL 1    [id number] HC ST EVAL ORAL PHARYNG SWALLOW 4 3/23/2018 MARISOL Lopez GN 1          SLP G-Codes  SLP NOMS Used?: Yes  Functional Limitations: Swallowing  Swallow Current Status (): 100 percent impaired, limited or restricted  Swallow Goal Status (): At least 60 percent but less than 80 percent impaired, limited or restricted    MARISOL Jennings  3/23/2018

## 2018-03-24 ENCOUNTER — APPOINTMENT (OUTPATIENT)
Dept: CARDIOLOGY | Facility: HOSPITAL | Age: 83
End: 2018-03-24
Attending: FAMILY MEDICINE

## 2018-03-24 LAB
ALBUMIN SERPL-MCNC: 3.4 G/DL (ref 3.5–5)
ALBUMIN/GLOB SERPL: 1.1 G/DL (ref 1.1–2.5)
ALP SERPL-CCNC: 63 U/L (ref 24–120)
ALT SERPL W P-5'-P-CCNC: 27 U/L (ref 0–54)
ANION GAP SERPL CALCULATED.3IONS-SCNC: 12 MMOL/L (ref 4–13)
ARTICHOKE IGE QN: 96 MG/DL (ref 0–99)
AST SERPL-CCNC: 32 U/L (ref 7–45)
BASOPHILS # BLD AUTO: 0.05 10*3/MM3 (ref 0–0.2)
BASOPHILS NFR BLD AUTO: 0.5 % (ref 0–2)
BH CV ECHO MEAS - AO MAX PG (FULL): 7.4 MMHG
BH CV ECHO MEAS - AO MAX PG: 9.2 MMHG
BH CV ECHO MEAS - AO MEAN PG (FULL): 5 MMHG
BH CV ECHO MEAS - AO MEAN PG: 6 MMHG
BH CV ECHO MEAS - AO ROOT AREA: 10.8 CM^2
BH CV ECHO MEAS - AO ROOT DIAM: 3.7 CM
BH CV ECHO MEAS - AO V2 MAX: 152 CM/SEC
BH CV ECHO MEAS - AO V2 MEAN: 109 CM/SEC
BH CV ECHO MEAS - AO V2 VTI: 28.9 CM
BH CV ECHO MEAS - AVA(I,A): 1.4 CM^2
BH CV ECHO MEAS - AVA(I,D): 1.4 CM^2
BH CV ECHO MEAS - AVA(V,A): 1.4 CM^2
BH CV ECHO MEAS - AVA(V,D): 1.4 CM^2
BH CV ECHO MEAS - CONTRAST EF 4CH: 68.4 ML/M^2
BH CV ECHO MEAS - EDV(CUBED): 65 ML
BH CV ECHO MEAS - EDV(MOD-SP4): 60.4 ML
BH CV ECHO MEAS - EDV(TEICH): 70.8 ML
BH CV ECHO MEAS - EF(CUBED): 72.3 %
BH CV ECHO MEAS - EF(MOD-SP4): 68.4 %
BH CV ECHO MEAS - EF(TEICH): 64.6 %
BH CV ECHO MEAS - ESV(CUBED): 18 ML
BH CV ECHO MEAS - ESV(MOD-SP4): 19.1 ML
BH CV ECHO MEAS - ESV(TEICH): 25.1 ML
BH CV ECHO MEAS - FS: 34.8 %
BH CV ECHO MEAS - IVS/LVPW: 0.83
BH CV ECHO MEAS - IVSD: 0.99 CM
BH CV ECHO MEAS - LA DIMENSION: 3.4 CM
BH CV ECHO MEAS - LA/AO: 0.92
BH CV ECHO MEAS - LV MASS(C)D: 146.1 GRAMS
BH CV ECHO MEAS - LV MAX PG: 1.9 MMHG
BH CV ECHO MEAS - LV MEAN PG: 1 MMHG
BH CV ECHO MEAS - LV V1 MAX: 68.7 CM/SEC
BH CV ECHO MEAS - LV V1 MEAN: 50.2 CM/SEC
BH CV ECHO MEAS - LV V1 VTI: 13.2 CM
BH CV ECHO MEAS - LVIDD: 4 CM
BH CV ECHO MEAS - LVIDS: 2.6 CM
BH CV ECHO MEAS - LVLD AP4: 7.4 CM
BH CV ECHO MEAS - LVLS AP4: 6 CM
BH CV ECHO MEAS - LVOT AREA (M): 3.1 CM^2
BH CV ECHO MEAS - LVOT AREA: 3.1 CM^2
BH CV ECHO MEAS - LVOT DIAM: 2 CM
BH CV ECHO MEAS - LVPWD: 1.2 CM
BH CV ECHO MEAS - MV A MAX VEL: 83.4 CM/SEC
BH CV ECHO MEAS - MV DEC TIME: 0.13 SEC
BH CV ECHO MEAS - MV E MAX VEL: 63.1 CM/SEC
BH CV ECHO MEAS - MV E/A: 0.76
BH CV ECHO MEAS - RAP SYSTOLE: 5 MMHG
BH CV ECHO MEAS - RVDD: 5.1 CM
BH CV ECHO MEAS - RVSP: 26 MMHG
BH CV ECHO MEAS - SV(AO): 310.7 ML
BH CV ECHO MEAS - SV(CUBED): 47 ML
BH CV ECHO MEAS - SV(LVOT): 41.5 ML
BH CV ECHO MEAS - SV(MOD-SP4): 41.3 ML
BH CV ECHO MEAS - SV(TEICH): 45.8 ML
BH CV ECHO MEAS - TR MAX VEL: 229 CM/SEC
BILIRUB SERPL-MCNC: 0.6 MG/DL (ref 0.1–1)
BUN BLD-MCNC: 26 MG/DL (ref 5–21)
BUN/CREAT SERPL: 24.3 (ref 7–25)
CALCIUM SPEC-SCNC: 8.6 MG/DL (ref 8.4–10.4)
CHLORIDE SERPL-SCNC: 105 MMOL/L (ref 98–110)
CHOLEST SERPL-MCNC: 156 MG/DL (ref 130–200)
CO2 SERPL-SCNC: 28 MMOL/L (ref 24–31)
CREAT BLD-MCNC: 1.07 MG/DL (ref 0.5–1.4)
DEPRECATED RDW RBC AUTO: 46 FL (ref 40–54)
E/E' RATIO: 13.5
EOSINOPHIL # BLD AUTO: 0.29 10*3/MM3 (ref 0–0.7)
EOSINOPHIL NFR BLD AUTO: 2.9 % (ref 0–4)
ERYTHROCYTE [DISTWIDTH] IN BLOOD BY AUTOMATED COUNT: 13.9 % (ref 12–15)
GFR SERPL CREATININE-BSD FRML MDRD: 65 ML/MIN/1.73
GLOBULIN UR ELPH-MCNC: 3.1 GM/DL
GLUCOSE BLD-MCNC: 114 MG/DL (ref 70–100)
HBA1C MFR BLD: 5.2 %
HCT VFR BLD AUTO: 39.3 % (ref 40–52)
HDLC SERPL-MCNC: 29 MG/DL
HGB BLD-MCNC: 12.9 G/DL (ref 14–18)
IMM GRANULOCYTES # BLD: 0.04 10*3/MM3 (ref 0–0.03)
IMM GRANULOCYTES NFR BLD: 0.4 % (ref 0–5)
LDLC/HDLC SERPL: 3.66 {RATIO}
LEFT ATRIUM VOLUME: 50.4 CM3
LV EF 2D ECHO EST: 60 %
LYMPHOCYTES # BLD AUTO: 1.37 10*3/MM3 (ref 0.72–4.86)
LYMPHOCYTES NFR BLD AUTO: 13.8 % (ref 15–45)
MAXIMAL PREDICTED HEART RATE: 130 BPM
MCH RBC QN AUTO: 29.5 PG (ref 28–32)
MCHC RBC AUTO-ENTMCNC: 32.8 G/DL (ref 33–36)
MCV RBC AUTO: 89.7 FL (ref 82–95)
MONOCYTES # BLD AUTO: 1.07 10*3/MM3 (ref 0.19–1.3)
MONOCYTES NFR BLD AUTO: 10.8 % (ref 4–12)
NEUTROPHILS # BLD AUTO: 7.12 10*3/MM3 (ref 1.87–8.4)
NEUTROPHILS NFR BLD AUTO: 71.6 % (ref 39–78)
NRBC BLD MANUAL-RTO: 0 /100 WBC (ref 0–0)
PLATELET # BLD AUTO: 184 10*3/MM3 (ref 130–400)
PMV BLD AUTO: 10.3 FL (ref 6–12)
POTASSIUM BLD-SCNC: 3.8 MMOL/L (ref 3.5–5.3)
PROT SERPL-MCNC: 6.5 G/DL (ref 6.3–8.7)
RBC # BLD AUTO: 4.38 10*6/MM3 (ref 4.8–5.9)
SODIUM BLD-SCNC: 145 MMOL/L (ref 135–145)
STRESS TARGET HR: 111 BPM
TRIGL SERPL-MCNC: 105 MG/DL (ref 0–149)
TSH SERPL DL<=0.05 MIU/L-ACNC: 0.98 MIU/ML (ref 0.47–4.68)
WBC NRBC COR # BLD: 9.94 10*3/MM3 (ref 4.8–10.8)

## 2018-03-24 PROCEDURE — 25010000002 PERFLUTREN 6.52 MG/ML SUSPENSION: Performed by: FAMILY MEDICINE

## 2018-03-24 PROCEDURE — 93306 TTE W/DOPPLER COMPLETE: CPT | Performed by: INTERNAL MEDICINE

## 2018-03-24 PROCEDURE — 94799 UNLISTED PULMONARY SVC/PX: CPT

## 2018-03-24 PROCEDURE — 80053 COMPREHEN METABOLIC PANEL: CPT | Performed by: FAMILY MEDICINE

## 2018-03-24 PROCEDURE — 25010000002 ENOXAPARIN PER 10 MG: Performed by: FAMILY MEDICINE

## 2018-03-24 PROCEDURE — 25010000002 THIAMINE PER 100 MG: Performed by: FAMILY MEDICINE

## 2018-03-24 PROCEDURE — 80061 LIPID PANEL: CPT | Performed by: FAMILY MEDICINE

## 2018-03-24 PROCEDURE — 25010000002 CEFTRIAXONE PER 250 MG: Performed by: INTERNAL MEDICINE

## 2018-03-24 PROCEDURE — 83036 HEMOGLOBIN GLYCOSYLATED A1C: CPT | Performed by: FAMILY MEDICINE

## 2018-03-24 PROCEDURE — 93306 TTE W/DOPPLER COMPLETE: CPT

## 2018-03-24 PROCEDURE — 84443 ASSAY THYROID STIM HORMONE: CPT | Performed by: FAMILY MEDICINE

## 2018-03-24 PROCEDURE — 85025 COMPLETE CBC W/AUTO DIFF WBC: CPT | Performed by: FAMILY MEDICINE

## 2018-03-24 PROCEDURE — 92526 ORAL FUNCTION THERAPY: CPT

## 2018-03-24 RX ORDER — FAMOTIDINE 10 MG/ML
20 INJECTION, SOLUTION INTRAVENOUS DAILY
Status: DISCONTINUED | OUTPATIENT
Start: 2018-03-25 | End: 2018-03-27 | Stop reason: HOSPADM

## 2018-03-24 RX ADMIN — IPRATROPIUM BROMIDE AND ALBUTEROL SULFATE 3 ML: 2.5; .5 SOLUTION RESPIRATORY (INHALATION) at 10:57

## 2018-03-24 RX ADMIN — ENOXAPARIN SODIUM 40 MG: 40 INJECTION SUBCUTANEOUS at 14:34

## 2018-03-24 RX ADMIN — CLINDAMYCIN PHOSPHATE 900 MG: 18 INJECTION, SOLUTION INTRAVENOUS at 04:39

## 2018-03-24 RX ADMIN — PERFLUTREN 8.48 MG: 6.52 INJECTION, SUSPENSION INTRAVENOUS at 09:45

## 2018-03-24 RX ADMIN — IPRATROPIUM BROMIDE AND ALBUTEROL SULFATE 3 ML: 2.5; .5 SOLUTION RESPIRATORY (INHALATION) at 06:27

## 2018-03-24 RX ADMIN — FAMOTIDINE 20 MG: 10 INJECTION INTRAVENOUS at 08:54

## 2018-03-24 RX ADMIN — THIAMINE HYDROCHLORIDE 75 ML/HR: 100 INJECTION, SOLUTION INTRAMUSCULAR; INTRAVENOUS at 08:54

## 2018-03-24 RX ADMIN — CLINDAMYCIN PHOSPHATE 900 MG: 18 INJECTION, SOLUTION INTRAVENOUS at 14:34

## 2018-03-24 RX ADMIN — CEFTRIAXONE SODIUM 1 G: 1 INJECTION, POWDER, FOR SOLUTION INTRAMUSCULAR; INTRAVENOUS at 03:29

## 2018-03-24 RX ADMIN — CLINDAMYCIN PHOSPHATE 900 MG: 18 INJECTION, SOLUTION INTRAVENOUS at 21:24

## 2018-03-24 RX ADMIN — IPRATROPIUM BROMIDE AND ALBUTEROL SULFATE 3 ML: 2.5; .5 SOLUTION RESPIRATORY (INHALATION) at 14:29

## 2018-03-24 RX ADMIN — IPRATROPIUM BROMIDE AND ALBUTEROL SULFATE 3 ML: 2.5; .5 SOLUTION RESPIRATORY (INHALATION) at 19:38

## 2018-03-24 NOTE — THERAPY TREATMENT NOTE
Acute Care - Speech Language Pathology   Swallow Treatment Note [facility]     Patient Name: Chaim Mackey  : 10/16/1927  MRN: [id number]  Today's Date: 3/24/2018               Admit Date: 3/22/2018  SLP tx completed. Pt remains lethargic at this time with signficant difficulty following commands even with max cues. Pt was able to complete 4x trial of pureed and honey thick consistencies with 3-5 multiple swallows per trial. SLP notes it to be difficult to assess vocal quality as he would not voice on command. When the pt did occasionaly voice his speech was noted to be dysarthric. Eventually the pt fatigued and was unable to complete anymore PO intake. SLP cannot fully r/o aspiration with PO intake completed. SLP recommends pt remain NPO as he would be at high risk of aspiration due to level of alertness. SLP will continue to follow and treat.  MS MARISOL Thompson 3/24/2018 3:30 PM    Visit Dx:      ICD-10-CM ICD-9-CM   1. Altered mental status, unspecified altered mental status type R41.82 780.97   2. Pneumonia of right lung due to infectious organism, unspecified part of lung J18.9 483.8   3. Dysphagia, unspecified type R13.10 787.20     Patient Active Problem List   Diagnosis   • Febrile illness   • Viral syndrome   • Systemic inflammatory response syndrome   • Altered mental status   • Pneumonia   • Dementia       Therapy Treatment    Therapy Treatment / Health Promotion    Treatment Time/Intention  Discipline: speech language pathologist (18 1435 : David Cazares MS CCC-SLP)  Document Type: therapy note (daily note) (18 1435 : MS MARISOL Singh)  Subjective Information: no complaints (18 1435 : MS MARISOL Singh)  Mode of Treatment: speech-language pathology (18 1435 : MS MARISOL Singh)  Care Plan Review: care plan/treatment goals reviewed (18 1435 : MS MARISOL Singh)  Patient Effort: poor (18 1435 : MS MARISOL Singh)  Plan  of Care Review  Plan of Care Reviewed With: patient, family (03/24/18 1521 : David Cazares MS CCC-SLP)    Vitals/Pain/Safety  Pain Assessment  Additional Documentation: Pain Scale: FACES Pre/Post-Treatment (Group) (03/24/18 1435 : David Cazares MS CCC-SLP)  Pain Scale: FACES Pre/Post-Treatment  Pain: FACES Scale, Pretreatment: 0-->no hurt (03/24/18 1435 : David Cazares MS CCC-SLP)    Cognition, Communication, Swallow  Recommendations  Anticipated Dischage Disposition: skilled nursing facility (03/24/18 1435 : David Cazares MS CCC-SLP)    Outcome Summary  Outcome Summary/Treatment Plan (SLP)  Daily Summary of Progress (SLP): progress toward functional goals is gradual (03/24/18 1435 : David Cazares MS CCC-SLP)  Anticipated Dischage Disposition: skilled nursing facility (03/24/18 1435 : David Cazares MS CCC-SLP)            SLP GOALS     Row Name 03/24/18 1435 03/23/18 0850          Oral Nutrition/Hydration Goal 1 (SLP)    Oral Nutrition/Hydration Goal 1, SLP Pt will tolerate LRD w/o any overt s/s of aspiration.  -CS Pt will tolerate least restricitve diet with no overt s/s of aspiration.  -MB     Time Frame (Oral Nutrition/Hydration Goal 1, SLP) by discharge  -CS by discharge  -MB     Barriers (Oral Nutrition/Hydration Goal 1, SLP)  -- n/a  -MB     Progress/Outcomes (Oral Nutrition/Hydration Goal 1, SLP) goal ongoing  -CS goal ongoing  -MB       User Key  (r) = Recorded By, (t) = Taken By, (c) = Cosigned By    Initials Name Provider Type    BEVERLEY Gonzalez, CCC-SLP Speech and Language Pathologist    CS David Cazares MS CCC-SLP Speech and Language Pathologist          EDUCATION  The patient has been educated in the following areas:   Dysphagia (Swallowing Impairment).    SLP Recommendation and Plan                       Anticipated Dischage Disposition: skilled nursing facility                Plan of Care Reviewed With: patient, family  Plan of Care Review  Plan of Care Reviewed With: patient, family  Daily  Summary of Progress (SLP): progress toward functional goals is gradual  Progress: no change  Outcome Summary: SLP tx completed. Pt remains lethargic at this time with signficant difficulty following commands even with max cues. Pt was able to complete 4x trial of pureed and honey thick consistencies with 3-5 multiple swallows per trial. SLP notes it  to be difficult to assess vocal quality as he would not voice on command. When the pt did occasionaly voice his speech was noted to be dysarthric. Eventually the pt fatigued and was unable to complete anymore PO intake. SLP cannot fully r/o aspiration with PO intake completed. SLP recommends pt remain NPO as he would be at high risk of aspiration due to level of alertness. SLP will continue to follow and treat.       SLP Outcome Measures (last 72 hours)      SLP Outcome Measures     Row Name 03/23/18 0900             SLP Outcome Measures    Outcome Measure Used? Adult NOMS  -MB         FCM Scores    FCM Chosen Swallowing  -MB      Swallowing FCM Score 1  -MB        User Key  (r) = Recorded By, (t) = Taken By, (c) = Cosigned By    Initials Name Effective Dates    BEVERLEY Gonzalez CCC-SLP 08/02/16 -              Time Calculation:         Time Calculation- SLP     Row Name 03/24/18 1528             Time Calculation- SLP    SLP Start Time 1435  -      SLP Stop Time 1517  -      SLP Time Calculation (min) 42 min  -      SLP Received On 03/24/18  -        User Key  (r) = Recorded By, (t) = Taken By, (c) = Cosigned By    Initials Name Provider Type    CS David Cazares MS CCC-SLP Speech and Language Pathologist          Therapy Charges for Today     Code Description Service Date Service Provider Modifiers Qty    [id number]  ST TREATMENT SWALLOW 3 3/24/2018 David Cazares MS CCC-SLP GN 1          SLP G-Codes  SLP NOMS Used?: Yes  Functional Limitations: Swallowing  Swallow Current Status (): 100 percent impaired, limited or restricted  Swallow Goal Status  (): At least 60 percent but less than 80 percent impaired, limited or restricted      David Cazares MS CCC-SLP  3/24/2018

## 2018-03-24 NOTE — PROGRESS NOTES
"Pharmacy Renal Dose Conversion    Chaim Mackey is a 89 y.o. year old male  162.6 cm (64\") 67.8 kg (149 lb 6.4 oz)    Estimated Creatinine Clearance: 44 mL/min (by C-G formula based on SCr of 1.07 mg/dL).   Lab Results   Component Value Date    CREATININE 1.07 03/24/2018    CREATININE 1.16 03/23/2018    CREATININE 1.24 03/22/2018       PLAN  Based on prescribing information provided by the drug , Famotidine 20mg IV Q12H,  has been changed to Famotidine 20mg IV Q24H    Adjusted per the directives and guidelines established by [facility] Pharmacy and Therapeutics Committee and [facility] Medical Executive Committee.  Pharmacy will continue to monitor daily and make further adjustment(s) accordingly.    Rodrigo Barger, [facility]  03/24/182:23 PM    "

## 2018-03-24 NOTE — PLAN OF CARE
Problem: Patient Care Overview  Goal: Plan of Care Review  Outcome: Ongoing (interventions implemented as appropriate)   03/24/18 0453   Coping/Psychosocial   Plan of Care Reviewed With patient   Plan of Care Review   Progress no change   OTHER   Outcome Summary pt lethargic most of day. not able to answer any questions. seen by speech and still NPO. family and sitter at bedside today. vss. will continue to monitor       Problem: Skin Injury Risk (Adult)  Goal: Skin Health and Integrity  Outcome: Ongoing (interventions implemented as appropriate)      Problem: Confusion, Acute (Adult)  Goal: Cognitive/Functional Impairments Minimized  Outcome: Ongoing (interventions implemented as appropriate)    Goal: Safety  Outcome: Ongoing (interventions implemented as appropriate)      Problem: Infection, Risk/Actual (Adult)  Goal: Infection Prevention/Resolution  Outcome: Ongoing (interventions implemented as appropriate)      Problem: Fall Risk (Adult)  Goal: Absence of Fall  Outcome: Ongoing (interventions implemented as appropriate)

## 2018-03-24 NOTE — PLAN OF CARE
Problem: Patient Care Overview  Goal: Plan of Care Review  Outcome: Ongoing (interventions implemented as appropriate)   03/24/18 1521   Coping/Psychosocial   Plan of Care Reviewed With patient;family   Plan of Care Review   Progress no change   OTHER   Outcome Summary SLP tx completed. Pt remains lethargic at this time with signficant difficulty following commands even with max cues. Pt was able to complete 4x trial of pureed and honey thick consistencies with 3-5 multiple swallows per trial. SLP notes it to be difficult to assess vocal quality as he would not voice on command. When the pt did occasionaly voice his speech was noted to be dysarthric. Eventually the pt fatigued and was unable to complete anymore PO intake. SLP cannot fully r/o aspiration with PO intake completed. SLP recommends pt remain NPO as he would be at high risk of aspiration due to level of alertness. SLP will continue to follow and treat.

## 2018-03-24 NOTE — PROGRESS NOTES
[facility] Medicine Services  INPATIENT PROGRESS NOTE    Length of Stay: 1  Date of Admission: 3/22/2018  Primary Care Physician: Addi Bishop DO    Subjective   Chief Complaint: Altered mental status/pneumonia    HPI   Patient is arousable.  The patient go by back to sleep.  MAXIMUM TEMPERATURE 98.  Vital signs stable.  Patient denies any chest pain or shortness of breath.    Review of Systems   Unable to assess secondary to the patient's sedated state.  Otherwise, as stated above.    All pertinent negatives and positives are as above. All other systems have been reviewed and are negative unless otherwise stated.     Objective    Temp:  [98 °F (36.7 °C)-98.8 °F (37.1 °C)] 98 °F (36.7 °C)  Heart Rate:  [72-82] 72  Resp:  [14-20] 18  BP: (113-155)/(53-80) 113/65  No intake or output data in the 24 hours ending 03/24/18 1625  Physical Exam  Constitutional: He appears well-developed.   HENT:   Head: Normocephalic and atraumatic.   Eyes: Conjunctivae are normal. Pupils are equal, round, and reactive to light.   Neck: Neck supple. No JVD present. No thyromegaly present.   Cardiovascular: Normal rate, regular rhythm, normal heart sounds and intact distal pulses.  Exam reveals no gallop and no friction rub.    No murmur heard.  Pulmonary/Chest: No respiratory distress. He has no wheezes. He has no rales. He exhibits no tenderness.   Diminished breath sound   Abdominal: Soft. Bowel sounds are normal. He exhibits no distension. There is no tenderness. There is no rebound and no guarding.   Musculoskeletal: He exhibits no edema, tenderness or deformity.   Lymphadenopathy:     He has no cervical adenopathy.   Neurological: He displays normal reflexes. No cranial nerve deficit. He exhibits abnormal muscle tone. Coordination abnormal.   Skin: Skin is warm and dry. No rash noted.   Nursing note and vitals reviewed.     Results Review:  Lab Results (last 24 hours)     Procedure Component  Value Units Date/Time    Blood Culture - Blood, [[id number]]  (Normal) Collected:  03/23/18 1145    Specimen:  Blood from Arm, Left Updated:  03/24/18 1301     Blood Culture No growth at 24 hours    Blood Culture - Blood, [[id number]]  (Normal) Collected:  03/23/18 0643    Specimen:  Blood from Arm, Left Updated:  03/24/18 0901     Blood Culture No growth at 24 hours    Blood Culture - Blood, [[id number]]  (Normal) Collected:  03/23/18 0643    Specimen:  Blood from Arm, Left Updated:  03/24/18 0901     Blood Culture No growth at 24 hours    TSH [[id number]]  (Normal) Collected:  03/24/18 0510    Specimen:  Blood Updated:  03/24/18 0717     TSH 0.977 mIU/mL     Hemoglobin A1c [[id number]] Collected:  03/24/18 0510    Specimen:  Blood Updated:  03/24/18 0710     Hemoglobin A1C 5.2 %     Narrative:       Less than 6.0           Non-Diabetic Range  6.0-7.0                 ADA Therapeutic Target  Greater than 7.0        Action Suggested    Lipid Panel [[id number]]  (Abnormal) Collected:  03/24/18 0510    Specimen:  Blood Updated:  03/24/18 0659     Total Cholesterol 156 mg/dL      Triglycerides 105 mg/dL      HDL Cholesterol 29 (L) mg/dL      LDL Cholesterol  96 mg/dL      LDL/HDL Ratio 3.66    Comprehensive Metabolic Panel [[id number]]  (Abnormal) Collected:  03/24/18 0510    Specimen:  Blood Updated:  03/24/18 0648     Glucose 114 (H) mg/dL      BUN 26 (H) mg/dL      Creatinine 1.07 mg/dL      Sodium 145 mmol/L      Potassium 3.8 mmol/L      Chloride 105 mmol/L      CO2 28.0 mmol/L      Calcium 8.6 mg/dL      Total Protein 6.5 g/dL      Albumin 3.40 (L) g/dL      ALT (SGPT) 27 U/L      AST (SGOT) 32 U/L      Alkaline Phosphatase 63 U/L      Total Bilirubin 0.6 mg/dL      eGFR Non African Amer 65 mL/min/1.73      Globulin 3.1 gm/dL      A/G Ratio 1.1 g/dL      BUN/Creatinine Ratio 24.3     Anion Gap 12.0 mmol/L     Narrative:       The MDRD GFR formula is only valid for adults with stable renal function between ages 18 and 70.     CBC & Differential [[id number]] Collected:  03/24/18 0510    Specimen:  Blood Updated:  03/24/18 0611    Narrative:       The following orders were created for panel order CBC & Differential.  Procedure                               Abnormality         Status                     ---------                               -----------         ------                     CBC Auto Differential[[id number]]        Abnormal            Final result                 Please view results for these tests on the individual orders.    CBC Auto Differential [[id number]]  (Abnormal) Collected:  03/24/18 0510    Specimen:  Blood Updated:  03/24/18 0611     WBC 9.94 10*3/mm3      RBC 4.38 (L) 10*6/mm3      Hemoglobin 12.9 (L) g/dL      Hematocrit 39.3 (L) %      MCV 89.7 fL      MCH 29.5 pg      MCHC 32.8 (L) g/dL      RDW 13.9 %      RDW-SD 46.0 fl      MPV 10.3 fL      Platelets 184 10*3/mm3      Neutrophil % 71.6 %      Lymphocyte % 13.8 (L) %      Monocyte % 10.8 %      Eosinophil % 2.9 %      Basophil % 0.5 %      Immature Grans % 0.4 %      Neutrophils, Absolute 7.12 10*3/mm3      Lymphocytes, Absolute 1.37 10*3/mm3      Monocytes, Absolute 1.07 10*3/mm3      Eosinophils, Absolute 0.29 10*3/mm3      Basophils, Absolute 0.05 10*3/mm3      Immature Grans, Absolute 0.04 (H) 10*3/mm3      nRBC 0.0 /100 WBC     Blood Culture - Blood, [[id number]]  (Normal) Collected:  03/23/18 0010    Specimen:  Blood from Arm, Left Updated:  03/24/18 0031     Blood Culture No growth at 24 hours    Blood Culture - Blood, [[id number]]  (Normal) Collected:  03/22/18 2350    Specimen:  Blood from Arm, Right Updated:  03/24/18 0016     Blood Culture No growth at 24 hours           Cultures:  Blood Culture   Date Value Ref Range Status   03/23/2018 No growth at 24 hours  Preliminary   03/23/2018 No growth at 24 hours  Preliminary   03/23/2018 No growth at 24 hours  Preliminary   03/23/2018 No growth at 24 hours  Preliminary   03/22/2018 No growth at 24 hours   Preliminary       Radiology Data:    Imaging Results (last 24 hours)     ** No results found for the last 24 hours. **          No Known Allergies    Scheduled meds:     ceftriaxone 1 g Intravenous Q24H   clindamycin 900 mg Intravenous Q8H   enoxaparin 40 mg Subcutaneous Daily   [START ON 3/25/2018] famotidine 20 mg Intravenous Daily   ipratropium-albuterol 3 mL Nebulization 4x Daily - RT   IV Fluids 1000 mL + additives 75 mL/hr Intravenous Daily       PRN meds:  •  acetaminophen  •  metoprolol tartrate  •  ondansetron  •  sodium chloride  •  sodium chloride    Assessment/Plan     Active Problems:    Altered mental status    Pneumonia    Dementia      Plan:  Altered mental status/metabolic encephalopathy     Pneumonia- continue clindamycin and rocephin.  Continue duo nebs.     Hypernatremia-half-normal saline.     Endstage Alzheimer Dementia     History of CAD-  Hold all by mouth medication due to metabolic encephalopathy.  Echocardiogram.     Hypertension-Lopressor when necessary     Nutrition-unable to take anything by mouth due to altered mental status.  Speech evaluate.  Banana bag.     Deconditioning - PT consult.     Discharge Planning:  unknown    Manuel Ingram MD   03/24/18   4:25 PM

## 2018-03-24 NOTE — PLAN OF CARE
Problem: Patient Care Overview  Goal: Plan of Care Review  Outcome: Ongoing (interventions implemented as appropriate)   03/23/18 1801 03/24/18 0401   Coping/Psychosocial   Plan of Care Reviewed With caregiver;daughter --    Plan of Care Review   Progress no change --    OTHER   Outcome Summary --  pt remains lethargic, but is opening his eyes and having gargled speech. continues npo. maintain IV banana bag. vss. pt resting comfortably       Problem: Skin Injury Risk (Adult)  Goal: Skin Health and Integrity  Outcome: Ongoing (interventions implemented as appropriate)      Problem: Confusion, Acute (Adult)  Goal: Cognitive/Functional Impairments Minimized  Outcome: Ongoing (interventions implemented as appropriate)    Goal: Safety  Outcome: Ongoing (interventions implemented as appropriate)      Problem: Infection, Risk/Actual (Adult)  Goal: Infection Prevention/Resolution  Outcome: Ongoing (interventions implemented as appropriate)      Problem: Fall Risk (Adult)  Goal: Absence of Fall  Outcome: Ongoing (interventions implemented as appropriate)

## 2018-03-25 LAB
ALBUMIN SERPL-MCNC: 3.2 G/DL (ref 3.5–5)
ALBUMIN/GLOB SERPL: 1.1 G/DL (ref 1.1–2.5)
ALP SERPL-CCNC: 63 U/L (ref 24–120)
ALT SERPL W P-5'-P-CCNC: 22 U/L (ref 0–54)
ANION GAP SERPL CALCULATED.3IONS-SCNC: 9 MMOL/L (ref 4–13)
AST SERPL-CCNC: 24 U/L (ref 7–45)
BASOPHILS # BLD AUTO: 0.06 10*3/MM3 (ref 0–0.2)
BASOPHILS NFR BLD AUTO: 0.7 % (ref 0–2)
BILIRUB SERPL-MCNC: 0.7 MG/DL (ref 0.1–1)
BUN BLD-MCNC: 24 MG/DL (ref 5–21)
BUN/CREAT SERPL: 20.2 (ref 7–25)
CALCIUM SPEC-SCNC: 8.3 MG/DL (ref 8.4–10.4)
CHLORIDE SERPL-SCNC: 104 MMOL/L (ref 98–110)
CO2 SERPL-SCNC: 30 MMOL/L (ref 24–31)
CREAT BLD-MCNC: 1.19 MG/DL (ref 0.5–1.4)
DEPRECATED RDW RBC AUTO: 45 FL (ref 40–54)
EOSINOPHIL # BLD AUTO: 0.34 10*3/MM3 (ref 0–0.7)
EOSINOPHIL NFR BLD AUTO: 3.7 % (ref 0–4)
ERYTHROCYTE [DISTWIDTH] IN BLOOD BY AUTOMATED COUNT: 13.8 % (ref 12–15)
GFR SERPL CREATININE-BSD FRML MDRD: 57 ML/MIN/1.73
GLOBULIN UR ELPH-MCNC: 3 GM/DL
GLUCOSE BLD-MCNC: 93 MG/DL (ref 70–100)
HCT VFR BLD AUTO: 37.9 % (ref 40–52)
HGB BLD-MCNC: 12.1 G/DL (ref 14–18)
IMM GRANULOCYTES # BLD: 0.05 10*3/MM3 (ref 0–0.03)
IMM GRANULOCYTES NFR BLD: 0.6 % (ref 0–5)
LYMPHOCYTES # BLD AUTO: 0.99 10*3/MM3 (ref 0.72–4.86)
LYMPHOCYTES NFR BLD AUTO: 10.9 % (ref 15–45)
MCH RBC QN AUTO: 28.7 PG (ref 28–32)
MCHC RBC AUTO-ENTMCNC: 31.9 G/DL (ref 33–36)
MCV RBC AUTO: 89.8 FL (ref 82–95)
MONOCYTES # BLD AUTO: 0.95 10*3/MM3 (ref 0.19–1.3)
MONOCYTES NFR BLD AUTO: 10.5 % (ref 4–12)
NEUTROPHILS # BLD AUTO: 6.68 10*3/MM3 (ref 1.87–8.4)
NEUTROPHILS NFR BLD AUTO: 73.6 % (ref 39–78)
NRBC BLD MANUAL-RTO: 0 /100 WBC (ref 0–0)
PLATELET # BLD AUTO: 192 10*3/MM3 (ref 130–400)
PMV BLD AUTO: 10 FL (ref 6–12)
POTASSIUM BLD-SCNC: 3.8 MMOL/L (ref 3.5–5.3)
PROT SERPL-MCNC: 6.2 G/DL (ref 6.3–8.7)
RBC # BLD AUTO: 4.22 10*6/MM3 (ref 4.8–5.9)
SODIUM BLD-SCNC: 143 MMOL/L (ref 135–145)
WBC NRBC COR # BLD: 9.07 10*3/MM3 (ref 4.8–10.8)

## 2018-03-25 PROCEDURE — 94799 UNLISTED PULMONARY SVC/PX: CPT

## 2018-03-25 PROCEDURE — 85025 COMPLETE CBC W/AUTO DIFF WBC: CPT | Performed by: FAMILY MEDICINE

## 2018-03-25 PROCEDURE — 25010000002 CEFTRIAXONE PER 250 MG: Performed by: INTERNAL MEDICINE

## 2018-03-25 PROCEDURE — 25010000002 THIAMINE PER 100 MG: Performed by: FAMILY MEDICINE

## 2018-03-25 PROCEDURE — 80053 COMPREHEN METABOLIC PANEL: CPT | Performed by: FAMILY MEDICINE

## 2018-03-25 PROCEDURE — 94760 N-INVAS EAR/PLS OXIMETRY 1: CPT

## 2018-03-25 PROCEDURE — 25010000002 KETOROLAC TROMETHAMINE PER 15 MG: Performed by: FAMILY MEDICINE

## 2018-03-25 PROCEDURE — 25010000002 ENOXAPARIN PER 10 MG: Performed by: FAMILY MEDICINE

## 2018-03-25 PROCEDURE — 92526 ORAL FUNCTION THERAPY: CPT

## 2018-03-25 RX ORDER — KETOROLAC TROMETHAMINE 15 MG/ML
15 INJECTION, SOLUTION INTRAMUSCULAR; INTRAVENOUS ONCE
Status: COMPLETED | OUTPATIENT
Start: 2018-03-25 | End: 2018-03-25

## 2018-03-25 RX ADMIN — CEFTRIAXONE SODIUM 1 G: 1 INJECTION, POWDER, FOR SOLUTION INTRAMUSCULAR; INTRAVENOUS at 04:17

## 2018-03-25 RX ADMIN — IPRATROPIUM BROMIDE AND ALBUTEROL SULFATE 3 ML: 2.5; .5 SOLUTION RESPIRATORY (INHALATION) at 20:06

## 2018-03-25 RX ADMIN — CLINDAMYCIN PHOSPHATE 900 MG: 18 INJECTION, SOLUTION INTRAVENOUS at 12:44

## 2018-03-25 RX ADMIN — FAMOTIDINE 20 MG: 10 INJECTION INTRAVENOUS at 09:15

## 2018-03-25 RX ADMIN — CLINDAMYCIN PHOSPHATE 900 MG: 18 INJECTION, SOLUTION INTRAVENOUS at 20:30

## 2018-03-25 RX ADMIN — IPRATROPIUM BROMIDE AND ALBUTEROL SULFATE 3 ML: 2.5; .5 SOLUTION RESPIRATORY (INHALATION) at 08:00

## 2018-03-25 RX ADMIN — CLINDAMYCIN PHOSPHATE 900 MG: 18 INJECTION, SOLUTION INTRAVENOUS at 05:32

## 2018-03-25 RX ADMIN — SODIUM CHLORIDE 75 ML/HR: 4.5 INJECTION, SOLUTION INTRAVENOUS at 04:19

## 2018-03-25 RX ADMIN — IPRATROPIUM BROMIDE AND ALBUTEROL SULFATE 3 ML: 2.5; .5 SOLUTION RESPIRATORY (INHALATION) at 15:46

## 2018-03-25 RX ADMIN — IPRATROPIUM BROMIDE AND ALBUTEROL SULFATE 3 ML: 2.5; .5 SOLUTION RESPIRATORY (INHALATION) at 11:20

## 2018-03-25 RX ADMIN — ENOXAPARIN SODIUM 40 MG: 40 INJECTION SUBCUTANEOUS at 12:43

## 2018-03-25 RX ADMIN — THIAMINE HYDROCHLORIDE 75 ML/HR: 100 INJECTION, SOLUTION INTRAMUSCULAR; INTRAVENOUS at 09:15

## 2018-03-25 RX ADMIN — KETOROLAC TROMETHAMINE 15 MG: 15 INJECTION, SOLUTION INTRAMUSCULAR; INTRAVENOUS at 16:23

## 2018-03-25 NOTE — PLAN OF CARE
Problem: Patient Care Overview  Goal: Plan of Care Review  Outcome: Ongoing (interventions implemented as appropriate)   03/25/18 1328   Coping/Psychosocial   Plan of Care Reviewed With patient   Plan of Care Review   Progress no change   OTHER   Outcome Summary SLP tx completed. Pt essentially unresponsive to verbal and tactile stimuli on this date except for briefly opening his eyes. With multiple trials of ice chip stimulation the pt was unable to protrude his tongue or complete any labial movement. SLP presented spoon coated in applesauce 3x and the pt would not attempt to obtain bolus. Pt remains inappropriate for a PO diet at this magdi. Pt may benefit from a dietician consult. SLP will continue to follow and treat.

## 2018-03-25 NOTE — PROGRESS NOTES
[facility] Medicine Services  INPATIENT PROGRESS NOTE    Length of Stay: 2  Date of Admission: 3/22/2018  Primary Care Physician: Addi Bishop DO    Subjective   Chief Complaint: Pneumonia/altered mental status    HPI   Patient's more alert today.  Still very confused from end-stage Alzheimer's.  Blood pressure is stable, afebrile.    Review of Systems   Unable to assess secondary to end-stage Alzheimer dementia.  Otherwise, as stated above.    All pertinent negatives and positives are as above. All other systems have been reviewed and are negative unless otherwise stated.     Objective    Temp:  [97.6 °F (36.4 °C)-98.9 °F (37.2 °C)] 97.6 °F (36.4 °C)  Heart Rate:  [63-83] 64  Resp:  [14-20] 16  BP: (100-145)/(49-86) 130/57    Intake/Output Summary (Last 24 hours) at 03/25/18 1419  Last data filed at 03/25/18 0914   Gross per 24 hour   Intake             1050 ml   Output                0 ml   Net             1050 ml     Physical Exam  Constitutional: He appears well-developed.   HENT:   Head: Normocephalic and atraumatic.   Eyes: Conjunctivae are normal. Pupils are equal, round, and reactive to light.   Neck: Neck supple. No JVD present. No thyromegaly present.   Cardiovascular: Normal rate, regular rhythm, normal heart sounds and intact distal pulses.  Exam reveals no gallop and no friction rub.    No murmur heard.  Pulmonary/Chest: No respiratory distress. He has no wheezes. He has no rales. He exhibits no tenderness.   Diminished breath sound   Abdominal: Soft. Bowel sounds are normal. He exhibits no distension. There is no tenderness. There is no rebound and no guarding.   Musculoskeletal: He exhibits no edema, tenderness or deformity.   Lymphadenopathy:     He has no cervical adenopathy.   Neurological: He displays normal reflexes. No cranial nerve deficit. He exhibits abnormal muscle tone. Coordination abnormal.   Skin: Skin is warm and dry. No rash noted.   Nursing  note and vitals reviewed.     Results Review:  Lab Results (last 24 hours)     Procedure Component Value Units Date/Time    Blood Culture - Blood, [[id number]]  (Normal) Collected:  03/23/18 1145    Specimen:  Blood from Arm, Left Updated:  03/25/18 1301     Blood Culture No growth at 2 days    Blood Culture - Blood, [[id number]]  (Normal) Collected:  03/23/18 0643    Specimen:  Blood from Arm, Left Updated:  03/25/18 0901     Blood Culture No growth at 2 days    Blood Culture - Blood, [[id number]]  (Normal) Collected:  03/23/18 0643    Specimen:  Blood from Arm, Left Updated:  03/25/18 0901     Blood Culture No growth at 2 days    Comprehensive Metabolic Panel [[id number]]  (Abnormal) Collected:  03/25/18 0636    Specimen:  Blood Updated:  03/25/18 0731     Glucose 93 mg/dL      BUN 24 (H) mg/dL      Creatinine 1.19 mg/dL      Sodium 143 mmol/L      Potassium 3.8 mmol/L      Chloride 104 mmol/L      CO2 30.0 mmol/L      Calcium 8.3 (L) mg/dL      Total Protein 6.2 (L) g/dL      Albumin 3.20 (L) g/dL      ALT (SGPT) 22 U/L      AST (SGOT) 24 U/L      Alkaline Phosphatase 63 U/L      Total Bilirubin 0.7 mg/dL      eGFR Non African Amer 57 (L) mL/min/1.73      Globulin 3.0 gm/dL      A/G Ratio 1.1 g/dL      BUN/Creatinine Ratio 20.2     Anion Gap 9.0 mmol/L     Narrative:       The MDRD GFR formula is only valid for adults with stable renal function between ages 18 and 70.    CBC & Differential [[id number]] Collected:  03/25/18 0636    Specimen:  Blood Updated:  03/25/18 0722    Narrative:       The following orders were created for panel order CBC & Differential.  Procedure                               Abnormality         Status                     ---------                               -----------         ------                     CBC Auto Differential[[id number]]        Abnormal            Final result                 Please view results for these tests on the individual orders.    CBC Auto Differential [[id number]]   (Abnormal) Collected:  03/25/18 0636    Specimen:  Blood Updated:  03/25/18 0722     WBC 9.07 10*3/mm3      RBC 4.22 (L) 10*6/mm3      Hemoglobin 12.1 (L) g/dL      Hematocrit 37.9 (L) %      MCV 89.8 fL      MCH 28.7 pg      MCHC 31.9 (L) g/dL      RDW 13.8 %      RDW-SD 45.0 fl      MPV 10.0 fL      Platelets 192 10*3/mm3      Neutrophil % 73.6 %      Lymphocyte % 10.9 (L) %      Monocyte % 10.5 %      Eosinophil % 3.7 %      Basophil % 0.7 %      Immature Grans % 0.6 %      Neutrophils, Absolute 6.68 10*3/mm3      Lymphocytes, Absolute 0.99 10*3/mm3      Monocytes, Absolute 0.95 10*3/mm3      Eosinophils, Absolute 0.34 10*3/mm3      Basophils, Absolute 0.06 10*3/mm3      Immature Grans, Absolute 0.05 (H) 10*3/mm3      nRBC 0.0 /100 WBC     Blood Culture - Blood, [[id number]]  (Normal) Collected:  03/23/18 0010    Specimen:  Blood from Arm, Left Updated:  03/25/18 0031     Blood Culture No growth at 2 days    Blood Culture - Blood, [[id number]]  (Normal) Collected:  03/22/18 2350    Specimen:  Blood from Arm, Right Updated:  03/25/18 0016     Blood Culture No growth at 2 days           Cultures:  Blood Culture   Date Value Ref Range Status   03/23/2018 No growth at 2 days  Preliminary   03/23/2018 No growth at 2 days  Preliminary   03/23/2018 No growth at 2 days  Preliminary   03/23/2018 No growth at 2 days  Preliminary   03/22/2018 No growth at 2 days  Preliminary       Radiology Data:    Imaging Results (last 24 hours)     ** No results found for the last 24 hours. **          No Known Allergies    Scheduled meds:     ceftriaxone 1 g Intravenous Q24H   clindamycin 900 mg Intravenous Q8H   enoxaparin 40 mg Subcutaneous Daily   famotidine 20 mg Intravenous Daily   ipratropium-albuterol 3 mL Nebulization 4x Daily - RT   IV Fluids 1000 mL + additives 75 mL/hr Intravenous Daily       PRN meds:  •  acetaminophen  •  metoprolol tartrate  •  ondansetron  •  sodium chloride  •  sodium chloride    Assessment/Plan     Active  Problems:    Altered mental status    Pneumonia    Dementia      Plan:  Altered mental status/metabolic encephalopathy- improving     Pneumonia- continue clindamycin and rocephin.  Continue duo nebs.     Hypernatremia- resolve     Endstage Alzheimer Dementia     History of CAD-  Hold all by mouth medication due to metabolic encephalopathy.  Echocardiogram Ejection fraction 60%, severely calcified aortic valve, right ventricle, moderate dilate.    Hypertension-Lopressor when necessary     Nutrition-unable to take anything by mouth due to altered mental status.  Speech evaluate.  Banana bag.     Deconditioning - PT consult.     Discharge Plannin-3 days    Manuel Ingram MD   18   2:19 PM

## 2018-03-25 NOTE — THERAPY TREATMENT NOTE
Acute Care - Speech Language Pathology   Swallow Treatment Note [facility]     Patient Name: Chaim Mackey  : 10/16/1927  MRN: [id number]  Today's Date: 3/25/2018               Admit Date: 3/22/2018  SLP tx completed. Pt essentially unresponsive to verbal and tactile stimuli on this date except for briefly opening his eyes. With multiple trials of ice chip stimulation the pt was unable to protrude his tongue or complete any labial movement. SLP presented spoon coated in applesauce 3x and the pt would not attempt to obtain bolus. Pt remains inappropriate for a PO diet at this magdi. Pt may benefit from a dietician consult. SLP will continue to follow and treat.  David Cazares MS CCC-SLP 3/25/2018 1:33 PM    Visit Dx:      ICD-10-CM ICD-9-CM   1. Altered mental status, unspecified altered mental status type R41.82 780.97   2. Pneumonia of right lung due to infectious organism, unspecified part of lung J18.9 483.8   3. Dysphagia, unspecified type R13.10 787.20     Patient Active Problem List   Diagnosis   • Febrile illness   • Viral syndrome   • Systemic inflammatory response syndrome   • Altered mental status   • Pneumonia   • Dementia       Therapy Treatment    Therapy Treatment / Health Promotion    Treatment Time/Intention  Discipline: speech language pathologist (18 1140 : David Cazares MS CCC-SLP)  Document Type: therapy note (daily note) (18 1140 : MS MARISOL Singh)  Subjective Information: no complaints (18 1140 : MS MARISOL Singh)  Mode of Treatment: speech-language pathology (18 1140 : David Cazares MS CCC-SLP)  Patient Effort: poor (18 1140 : MS MARISOL Singh)  Plan of Care Review  Plan of Care Reviewed With: patient (18 1328 : MS MARISOL Singh)    Vitals/Pain/Safety  Pain Scale: FACES Pre/Post-Treatment  Pain: FACES Scale, Pretreatment: 0-->no hurt (18 1140 : MS MARISOL Singh)    Cognition, Communication,  Swallow  Recommendations  Anticipated Dischage Disposition: skilled nursing facility (03/25/18 1140 : David Cazares MS CCC-SLP)  Demonstrates Need for Referral to Another Service: clinical nutrition services/dietitian (03/25/18 1140 : David Cazares MS CCC-SLP)    Outcome Summary  Outcome Summary/Treatment Plan (SLP)  Daily Summary of Progress (SLP): unable to show any progress toward functional goals (03/25/18 1140 : David Cazares MS CCC-SLP)  Anticipated Dischage Disposition: skilled nursing facility (03/25/18 1140 : David Cazares MS CCC-SLP)  Demonstrates Need for Referral to Another Service: clinical nutrition services/dietitian (03/25/18 1140 : David Cazares MS CCC-SLP)            SLP GOALS     Row Name 03/25/18 1140 03/24/18 1435 03/23/18 0850       Oral Nutrition/Hydration Goal 1 (SLP)    Oral Nutrition/Hydration Goal 1, SLP Pt will toelrate LRD w/o any overt s/s of aspiration.  -CS Pt will tolerate LRD w/o any overt s/s of aspiration.  -CS Pt will tolerate least restricitve diet with no overt s/s of aspiration.  -MB    Time Frame (Oral Nutrition/Hydration Goal 1, SLP) by discharge  -CS by discharge  -CS by discharge  -MB    Barriers (Oral Nutrition/Hydration Goal 1, SLP)  --  -- n/a  -MB    Progress/Outcomes (Oral Nutrition/Hydration Goal 1, SLP) goal ongoing  -CS goal ongoing  -CS goal ongoing  -MB      User Key  (r) = Recorded By, (t) = Taken By, (c) = Cosigned By    Initials Name Provider Type    BEVERLEY Gonzalez, CCC-SLP Speech and Language Pathologist    CS David Cazares MS CCC-SLP Speech and Language Pathologist          EDUCATION  The patient has been educated in the following areas:   Dysphagia (Swallowing Impairment).    SLP Recommendation and Plan                       Anticipated Dischage Disposition: skilled nursing facility           Demonstrates Need for Referral to Another Service: clinical nutrition services/dietitian    Plan of Care Reviewed With: patient  Plan of Care Review  Plan of  Care Reviewed With: patient  Daily Summary of Progress (SLP): unable to show any progress toward functional goals  Progress: no change  Outcome Summary: SLP tx completed. Pt essentially unresponsive to verbal and tactile stimuli on this date except for briefly opening his eyes. With multiple trials of ice chip stimulation the pt was unable to protrude his tongue or complete any labial movement. SLP presented spoon coated in applesauce 3x  and the pt would not attempt to obtain bolus. Pt remains inappropriate for a PO diet at this magdi. Pt may benefit from a dietician consult. SLP will continue to follow and treat.       SLP Outcome Measures (last 72 hours)      SLP Outcome Measures     Row Name 03/23/18 0900             SLP Outcome Measures    Outcome Measure Used? Adult NOMS  -MB         FCM Scores    FCM Chosen Swallowing  -MB      Swallowing FCM Score 1  -MB        User Key  (r) = Recorded By, (t) = Taken By, (c) = Cosigned By    Initials Name Effective Dates    MB Ronel Gonzalez CCC-NAYELI 08/02/16 -              Time Calculation:         Time Calculation- SLP     Row Name 03/25/18 1332             Time Calculation- SLP    SLP Start Time 1140  -      SLP Stop Time 1203  -      SLP Time Calculation (min) 23 min  -      SLP Received On 03/25/18  -        User Key  (r) = Recorded By, (t) = Taken By, (c) = Cosigned By    Initials Name Provider Type     David Cazares MS CCC-SLP Speech and Language Pathologist          Therapy Charges for Today     Code Description Service Date Service Provider Modifiers Qty    14891911535 HC ST TREATMENT SWALLOW 3 3/24/2018 David Cazares MS CCC-SLP GN 1    [id number] HC ST TREATMENT SWALLOW 2 3/25/2018 David Cazares MS CCC-SLP GN 1          SLP G-Codes  SLP NOMS Used?: Yes  Functional Limitations: Swallowing  Swallow Current Status (): 100 percent impaired, limited or restricted  Swallow Goal Status (): At least 60 percent but less than 80 percent impaired,  limited or restricted      David Cazares MS CCC-SLP  3/25/2018

## 2018-03-25 NOTE — PLAN OF CARE
Problem: Patient Care Overview  Goal: Plan of Care Review  Outcome: Ongoing (interventions implemented as appropriate)   03/25/18 6125   Coping/Psychosocial   Plan of Care Reviewed With patient   Plan of Care Review   Progress no change   OTHER   Outcome Summary pt lethargic most of day. did c/o head pain to daughter. medicated x1. vss. will continue to monitor       Problem: Skin Injury Risk (Adult)  Goal: Skin Health and Integrity  Outcome: Ongoing (interventions implemented as appropriate)      Problem: Confusion, Acute (Adult)  Goal: Cognitive/Functional Impairments Minimized  Outcome: Ongoing (interventions implemented as appropriate)    Goal: Safety  Outcome: Ongoing (interventions implemented as appropriate)      Problem: Infection, Risk/Actual (Adult)  Goal: Infection Prevention/Resolution  Outcome: Ongoing (interventions implemented as appropriate)      Problem: Fall Risk (Adult)  Goal: Absence of Fall  Outcome: Ongoing (interventions implemented as appropriate)

## 2018-03-25 NOTE — PLAN OF CARE
Problem: Patient Care Overview  Goal: Plan of Care Review  Outcome: Ongoing (interventions implemented as appropriate)    Goal: Individualization and Mutuality  Outcome: Ongoing (interventions implemented as appropriate)    Goal: Discharge Needs Assessment  Outcome: Ongoing (interventions implemented as appropriate)    Goal: Interprofessional Rounds/Family Conf  Outcome: Ongoing (interventions implemented as appropriate)      Problem: Confusion, Acute (Adult)  Goal: Cognitive/Functional Impairments Minimized  Outcome: Ongoing (interventions implemented as appropriate)    Goal: Safety  Outcome: Ongoing (interventions implemented as appropriate)      Problem: Infection, Risk/Actual (Adult)  Goal: Infection Prevention/Resolution  Outcome: Ongoing (interventions implemented as appropriate)      Problem: Fall Risk (Adult)  Goal: Absence of Fall  Outcome: Ongoing (interventions implemented as appropriate)

## 2018-03-26 LAB
ALBUMIN SERPL-MCNC: 3 G/DL (ref 3.5–5)
ALBUMIN/GLOB SERPL: 1 G/DL (ref 1.1–2.5)
ALP SERPL-CCNC: 57 U/L (ref 24–120)
ALT SERPL W P-5'-P-CCNC: 17 U/L (ref 0–54)
ANION GAP SERPL CALCULATED.3IONS-SCNC: 10 MMOL/L (ref 4–13)
AST SERPL-CCNC: 19 U/L (ref 7–45)
BASOPHILS # BLD AUTO: 0.04 10*3/MM3 (ref 0–0.2)
BASOPHILS NFR BLD AUTO: 0.6 % (ref 0–2)
BILIRUB SERPL-MCNC: 0.4 MG/DL (ref 0.1–1)
BUN BLD-MCNC: 25 MG/DL (ref 5–21)
BUN/CREAT SERPL: 21.2 (ref 7–25)
CALCIUM SPEC-SCNC: 7.9 MG/DL (ref 8.4–10.4)
CHLORIDE SERPL-SCNC: 101 MMOL/L (ref 98–110)
CO2 SERPL-SCNC: 29 MMOL/L (ref 24–31)
CREAT BLD-MCNC: 1.18 MG/DL (ref 0.5–1.4)
DEPRECATED RDW RBC AUTO: 43.8 FL (ref 40–54)
EOSINOPHIL # BLD AUTO: 0.33 10*3/MM3 (ref 0–0.7)
EOSINOPHIL NFR BLD AUTO: 5.2 % (ref 0–4)
ERYTHROCYTE [DISTWIDTH] IN BLOOD BY AUTOMATED COUNT: 13.5 % (ref 12–15)
GFR SERPL CREATININE-BSD FRML MDRD: 58 ML/MIN/1.73
GLOBULIN UR ELPH-MCNC: 3 GM/DL
GLUCOSE BLD-MCNC: 84 MG/DL (ref 70–100)
HCT VFR BLD AUTO: 36 % (ref 40–52)
HGB BLD-MCNC: 11.9 G/DL (ref 14–18)
IMM GRANULOCYTES # BLD: 0.03 10*3/MM3 (ref 0–0.03)
IMM GRANULOCYTES NFR BLD: 0.5 % (ref 0–5)
LYMPHOCYTES # BLD AUTO: 0.93 10*3/MM3 (ref 0.72–4.86)
LYMPHOCYTES NFR BLD AUTO: 14.7 % (ref 15–45)
MCH RBC QN AUTO: 29.2 PG (ref 28–32)
MCHC RBC AUTO-ENTMCNC: 33.1 G/DL (ref 33–36)
MCV RBC AUTO: 88.5 FL (ref 82–95)
MONOCYTES # BLD AUTO: 0.63 10*3/MM3 (ref 0.19–1.3)
MONOCYTES NFR BLD AUTO: 10 % (ref 4–12)
NEUTROPHILS # BLD AUTO: 4.35 10*3/MM3 (ref 1.87–8.4)
NEUTROPHILS NFR BLD AUTO: 69 % (ref 39–78)
NRBC BLD MANUAL-RTO: 0 /100 WBC (ref 0–0)
PLATELET # BLD AUTO: 184 10*3/MM3 (ref 130–400)
PMV BLD AUTO: 9.8 FL (ref 6–12)
POTASSIUM BLD-SCNC: 3.6 MMOL/L (ref 3.5–5.3)
PROT SERPL-MCNC: 6 G/DL (ref 6.3–8.7)
RBC # BLD AUTO: 4.07 10*6/MM3 (ref 4.8–5.9)
SODIUM BLD-SCNC: 140 MMOL/L (ref 135–145)
WBC NRBC COR # BLD: 6.31 10*3/MM3 (ref 4.8–10.8)

## 2018-03-26 PROCEDURE — 80053 COMPREHEN METABOLIC PANEL: CPT | Performed by: FAMILY MEDICINE

## 2018-03-26 PROCEDURE — 92610 EVALUATE SWALLOWING FUNCTION: CPT

## 2018-03-26 PROCEDURE — 85025 COMPLETE CBC W/AUTO DIFF WBC: CPT | Performed by: FAMILY MEDICINE

## 2018-03-26 PROCEDURE — 94799 UNLISTED PULMONARY SVC/PX: CPT

## 2018-03-26 PROCEDURE — G8997 SWALLOW GOAL STATUS: HCPCS

## 2018-03-26 PROCEDURE — 25010000002 CEFTRIAXONE PER 250 MG: Performed by: INTERNAL MEDICINE

## 2018-03-26 PROCEDURE — 25010000002 ENOXAPARIN PER 10 MG: Performed by: FAMILY MEDICINE

## 2018-03-26 PROCEDURE — G8996 SWALLOW CURRENT STATUS: HCPCS

## 2018-03-26 PROCEDURE — 94760 N-INVAS EAR/PLS OXIMETRY 1: CPT

## 2018-03-26 PROCEDURE — 25010000002 THIAMINE PER 100 MG: Performed by: FAMILY MEDICINE

## 2018-03-26 RX ORDER — MEMANTINE HYDROCHLORIDE 5 MG/1
5 TABLET ORAL DAILY
Status: DISCONTINUED | OUTPATIENT
Start: 2018-03-26 | End: 2018-03-27 | Stop reason: HOSPADM

## 2018-03-26 RX ORDER — ASPIRIN 81 MG/1
81 TABLET ORAL DAILY
Status: DISCONTINUED | OUTPATIENT
Start: 2018-03-26 | End: 2018-03-27 | Stop reason: HOSPADM

## 2018-03-26 RX ORDER — CARVEDILOL 6.25 MG/1
12.5 TABLET ORAL EVERY 12 HOURS SCHEDULED
Status: DISCONTINUED | OUTPATIENT
Start: 2018-03-26 | End: 2018-03-27 | Stop reason: HOSPADM

## 2018-03-26 RX ADMIN — ENOXAPARIN SODIUM 40 MG: 40 INJECTION SUBCUTANEOUS at 13:58

## 2018-03-26 RX ADMIN — CARVEDILOL 12.5 MG: 6.25 TABLET, FILM COATED ORAL at 19:58

## 2018-03-26 RX ADMIN — CLINDAMYCIN PHOSPHATE 900 MG: 18 INJECTION, SOLUTION INTRAVENOUS at 04:02

## 2018-03-26 RX ADMIN — IPRATROPIUM BROMIDE AND ALBUTEROL SULFATE 3 ML: 2.5; .5 SOLUTION RESPIRATORY (INHALATION) at 07:52

## 2018-03-26 RX ADMIN — CLINDAMYCIN PHOSPHATE 900 MG: 18 INJECTION, SOLUTION INTRAVENOUS at 13:58

## 2018-03-26 RX ADMIN — ASPIRIN 81 MG: 81 TABLET ORAL at 19:59

## 2018-03-26 RX ADMIN — IPRATROPIUM BROMIDE AND ALBUTEROL SULFATE 3 ML: 2.5; .5 SOLUTION RESPIRATORY (INHALATION) at 11:17

## 2018-03-26 RX ADMIN — SODIUM CHLORIDE 75 ML/HR: 4.5 INJECTION, SOLUTION INTRAVENOUS at 03:57

## 2018-03-26 RX ADMIN — SODIUM CHLORIDE 75 ML/HR: 4.5 INJECTION, SOLUTION INTRAVENOUS at 23:00

## 2018-03-26 RX ADMIN — CEFTRIAXONE SODIUM 1 G: 1 INJECTION, POWDER, FOR SOLUTION INTRAMUSCULAR; INTRAVENOUS at 03:54

## 2018-03-26 RX ADMIN — THIAMINE HYDROCHLORIDE 75 ML/HR: 100 INJECTION, SOLUTION INTRAMUSCULAR; INTRAVENOUS at 08:47

## 2018-03-26 RX ADMIN — IPRATROPIUM BROMIDE AND ALBUTEROL SULFATE 3 ML: 2.5; .5 SOLUTION RESPIRATORY (INHALATION) at 15:17

## 2018-03-26 RX ADMIN — CLINDAMYCIN PHOSPHATE 900 MG: 18 INJECTION, SOLUTION INTRAVENOUS at 19:58

## 2018-03-26 RX ADMIN — FAMOTIDINE 20 MG: 10 INJECTION INTRAVENOUS at 09:00

## 2018-03-26 RX ADMIN — MEMANTINE HYDROCHLORIDE 5 MG: 5 TABLET, FILM COATED ORAL at 19:59

## 2018-03-26 NOTE — PLAN OF CARE
Problem: Patient Care Overview  Goal: Plan of Care Review  Outcome: Ongoing (interventions implemented as appropriate)   03/26/18 0926   Plan of Care Review   Progress no change   OTHER   Outcome Summary Initial RDN eval. Pt has been NPO x3 days. If likely to remain NPO and Pt/family desire aggressive treatment, Pt may benefit from [facility]. Will continue to follow and make further recommendations as needed.       Problem: Nutrition, Imbalanced: Inadequate Oral Intake (Adult)  Goal: Identify Related Risk Factors and Signs and Symptoms  Outcome: Outcome(s) achieved Date Met: 03/26/18 03/26/18 0926   Nutrition, Imbalanced: Inadequate Oral Intake (Adult)   Related Risk Factors (Nutrition Imbalance, Inadequate Oral Intake) chronic illness/infection;functional disability;appetite decreased;NPO status prolonged     Goal: Improved Oral Intake  Outcome: Ongoing (interventions implemented as appropriate)   03/26/18 0926   Nutrition, Imbalanced: Inadequate Oral Intake (Adult)   Improved Oral Intake unable to achieve outcome     Goal: Prevent Further Weight Loss  Outcome: Ongoing (interventions implemented as appropriate)   03/26/18 0926   Nutrition, Imbalanced: Inadequate Oral Intake (Adult)   Prevent Further Weight Loss other (see comments)  (no wt loss noted since admission)

## 2018-03-26 NOTE — PROGRESS NOTES
Continued Stay Note  BERTIN Nickerson     Patient Name: Chaim Mackey  MRN: [id number]  Today's Date: 3/26/2018    Admit Date: 3/22/2018          Discharge Plan     Row Name 03/26/18 0932       Plan    Plan Hospice?    Patient/Family in Agreement with Plan yes    Plan Comments Family had mentioned to SW student on initial assessment that they were open to hospice. Note pt now NPO. If family not wanting feeding tube placed could we get hospice ordered? Pt lives at home with sitters around the clock.  Will follow.               Discharge Codes    No documentation.           OUMAR Rodriguez

## 2018-03-26 NOTE — PLAN OF CARE
Problem: Patient Care Overview  Goal: Plan of Care Review  Outcome: Ongoing (interventions implemented as appropriate)   03/26/18 1013   Coping/Psychosocial   Plan of Care Reviewed With patient;daughter   Plan of Care Review   Progress improving   OTHER   Outcome Summary Completed CSE. Pt was alert upon entry to room. Presented pt with puree, honey-thick, nectar thick and thin trials. Pt did not show any overt s/s of aspiration but did have difficulty with bolus manipulation and transit. When presented oral trials pt presented with difficulty accepting bolus and demonstrated delayed manipulation and transit time. The daughter was in the room during eval and reported that family/caregivers were planning to change diet consistencies at home. Educated daughter on safety concerns/diet modification recommendations. At this time ST has concerns about cognitive status in relation to bolus manipulation. ST recommends puree and honey-thick liquids. Meds crushed in pudding/applesauce if safe to be crushed, ok for ice chips in between meals, being 30 minutes following any other PO intake, without increased congestion. RN to monitor for increased congestion. 1:1 feedings with staff or family. ST to follow and tx.

## 2018-03-26 NOTE — THERAPY EVALUATION
Acute Care - Speech Language Pathology   Swallow Initial Evaluation [facility]     Patient Name: Chaim Mackey  : 10/16/1927  MRN: [id number]  Today's Date: 3/26/2018  Onset of Illness/Injury or Date of Surgery: 18     Referring Physician: Dr. Collier      Admit Date: 3/22/2018    SPEECH-LANGUAGE PATHOLOGY EVALUATION - SWALLOW  Subjective: The patient was seen on this date for a Clinical Swallow evaluation.  Patient was alert and cooperative.  Objective: Textures given included ice, thin liquid, nectar thick liquid, honey thick liquid and puree consistency.  Assessment: Difficulties were noted with ice, thin liquid and nectar thick liquid.  Observations: Pt was alert upon entry to room. Presented pt with puree, honey-thick, nectar thick and thin liquid trials. Pt did not show any overt s/s of aspiration but did have difficulty with bolus manipulation and transit. When presented oral trials pt presented with difficulty accepting bolus and demonstrated delayed manipulation and transit time. Pt demonstrated reduced oral control and oral holding in the anterior portion of the oral cavity during puree, honey thick and nectar thick consistencies. Pt had difficulty following 1-step directions in relation to acceptance of the bolus into the oral cavity.The daughter was in the room during eval and reported that family/caregivers were planning to change diet consistencies at home due to difficulty with pt swallow. Educated daughter on safety concerns/diet modification recommendations. At this time ST has concerns about cognitive status in relation to safe bolus acceptance/manipulation. ST recommends puree and honey-thick liquids. ST to follow and tx.  SLP Findings:  Patient presents with mild oropharyngeal dysphagia, unknown esophageal component.   Recommendations: Diet Textures: honey thick liquid, puree consistency food.  Medications should be taken crushed with thickened liquids or puree. May have ice between  meals after oral care, under staff or family supervision and with the recommended strategies for safe swallowing.   Recommended Strategies: Upright for PO, small bites and sips, may use straw, alternate liquids and solids and supervision with all PO. Oral care before breakfast, after all meals and PRN.  Dysphagia therapy is recommended. Rationale: see above.   Visit Dx:    ICD-10-CM ICD-9-CM   1. Altered mental status, unspecified altered mental status type R41.82 780.97   2. Pneumonia of right lung due to infectious organism, unspecified part of lung J18.9 483.8   3. Dysphagia, unspecified type R13.10 787.20     Patient Active Problem List   Diagnosis   • Febrile illness   • Viral syndrome   • Systemic inflammatory response syndrome   • Altered mental status   • Pneumonia   • Dementia     Past Medical History:   Diagnosis Date   • Coronary artery disease    • Dementia    • DVT (deep venous thrombosis)    • Gout    • Hypertension    • Myocardial infarction      Past Surgical History:   Procedure Laterality Date   • APPENDECTOMY     • CORONARY ARTERY BYPASS GRAFT     • MASTOID SURGERY     • PACEMAKER IMPLANTATION            SWALLOW EVALUATION (last 72 hours)      [facility] Adult Swallow Evaluation     Row Name 03/26/18 0853                   Rehab Evaluation    Document Type (P)  evaluation  -KB        Subjective Information (P)  no complaints  -KB        Patient Effort (P)  good  -KB           General Information    Patient Profile Reviewed (P)  yes  -KB        Pertinent History Of Current Problem (P)  --   dementia, CAD, DVT, HTN, MI, CABG, mastoid surgery, pacemake  -KB        Current Method of Nutrition (P)  NPO  -KB        Precautions/Limitations, Vision (P)  WFL with corrective lenses  -KB        Precautions/Limitations, Hearing (P)  hearing aid, bilaterally  -KB        Prior Level of Function-Communication (P)  cognitive-linguistic impairment  -KB        Prior Level of Function-Swallowing (P)  no diet consistency  restrictions  -KB        Plans/Goals Discussed with (P)  patient;family  -KB        Barriers to Rehab (P)  cognitive status;hearing deficit  -KB        Patient's Goals for Discharge (P)  patient did not state  -KB        Family Goals for Discharge (P)  patient able to return to PO diet;patient able to eat/drink without coughing/choking  -KB           Pain Assessment    Additional Documentation (P)  Pain Scale: Numbers Pre/Post-Treatment (Group)  -KB           Pain Scale: Numbers Pre/Post-Treatment    Pain Scale: Numbers, Pretreatment (P)  0/10 - no pain  -KB        Pain Scale: Numbers, Post-Treatment (P)  0/10 - no pain  -KB           Oral Motor and Function    Dentition Assessment (P)  natural, present and adequate  -KB        Secretion Management (P)  WNL/WFL  -KB        Mucosal Quality (P)  moist, healthy  -KB        Volitional Swallow (P)  WFL  -KB        Volitional Cough (P)  other (see comments)   did not elicit  -KB           Oral Musculature and Cranial Nerve Assessment    Oral Motor General Assessment (P)  generalized oral motor weakness;mandibular impairment;oral labial or buccal impairment  -KB        Mandibular Impairment Detail, Cranial Nerve V (Trigeminal) (P)  CN5: motor impairment;reduced mandibular ROM;reduced strength bilaterally  -KB        Oral Labial or Buccal Impairment, Detail, Cranial Nerve VII (Facial): (P)  CN7: Motor Impairment;reduced ROM;reduced strength bilaterally  -KB           General Eating/Swallowing Observations    Respiratory Support Currently in Use (P)  room air  -KB        Eating/Swallowing Skills (P)  fed by SLP  -KB        Positioning During Eating (P)  upright in bed;semi-reclined  -KB        Utensils Used (P)  cup;spoon;straw  -KB        Consistencies Trialed (P)  pureed;honey-thick liquids;nectar/syrup-thick liquids;thin liquids  -KB           Clinical Swallow Eval    Oral Prep Phase (P)  impaired  -KB        Oral Transit (P)  impaired  -KB        Oral Residue (P)  WFL   -KB        Pharyngeal Phase (P)  no overt signs/symptoms of pharyngeal impairment  -KB        Clinical Swallow Evaluation Summary (P)  Completed CSE. Pt was alert upon entry to room. Presented pt with puree, honey-thick, nectar thick and thin trials. Pt did not show any overt s/s of aspiration but did have difficulty with bolus manipulation and transit. When presented oral trials pt presented with difficulty accepting bolus and demonstrated delayed manipulation and transit time. The daughter was in the room during eval and reported that family/caregivers were planning to change diet consistencies at home. Educated daughter on safety concerns/diet modification recommendations. At this time ST has concerns about cognitive status in relation to bolus manipulation. ST recommends puree and honey-thick liquids. ST to follow and tx.  -KB           Oral Prep Concerns    Oral Prep Concerns (P)  reduced lip opening;incomplete or weak lip closure around spoon;increased prep time;incomplete bolus preparation  -KB        Reduced Lip Opening (P)  thin;nectar;honey;pudding  -KB        Incomplete or Weak Lip Closure Around Spoon (P)  nectar;honey;pudding;thin  -KB        Incomplete Bolus Preparation (P)  honey;pudding;nectar  -KB        Increased Prep Time (P)  nectar;honey;pudding;thin  -KB           Oral Transit Concerns    Oral Transit Concerns (P)  delayed initiation of bolus transit;increased oral transit time  -KB        Delayed Intiation of Bolus Transit (P)  nectar;honey;pudding;thin  -KB        Increased Oral Transit Time (P)  thin;nectar;honey;pudding  -KB           Anatomy and Physiology    Anatomic Considerations (P)  no anatomic structural deviation  -KB           Clinical Impression    SLP Swallowing Diagnosis (P)  moderate;oral dysfunction;suspected pharyngeal dysfunction  -KB        Functional Impact (P)  risk of aspiration/pneumonia;risk of malnutrition;risk of dehydration  -KB        Rehab Potential/Prognosis,  Swallowing (P)  adequate, monitor progress closely  -KB        Criteria for Skilled Therapeutic Interventions Met (P)  demonstrates skilled criteria  -KB           Recommendations    Therapy Frequency (SLP) (P)  at least;3 days per week  -KB        Predicted Duration Therapy Intervention (Days) (P)  until discharge  -KB        SLP Diet Recommendation (P)  puree;honey thick liquids;ice chips between meals after oral care, with supervision  -KB        Recommended Precautions and Strategies (P)  upright posture during/after eating;small bites of food and sips of liquid  -KB        SLP Rec. for Method of Medication Administration (P)  meds crushed;with pudding or applesauce  -KB        Monitor for Signs of Aspiration (P)  yes;cough;gurgly voice;throat clearing;notify SLP if any concerns  -KB        Anticipated Dischage Disposition (P)  home with assist;home with home health  -KB           Swallow Goals (SLP)    Oral Nutrition/Hydration Goal Selection (SLP) (P)  oral nutrition/hydration, SLP goal 1  -KB        Additional Documentation (P)  labial strengthening goal selection (SLP);pharyngeal strengthening exercise goal selection (SLP)  -KB           Oral Nutrition/Hydration Goal 1 (SLP)    Oral Nutrition/Hydration Goal 1, SLP (P)  --   Pt will tolerate LRD w/o any overt s/s of aspiration  -KB        Time Frame (Oral Nutrition/Hydration Goal 1, SLP) (P)  short term goal (STG);by discharge  -KB        Barriers (Oral Nutrition/Hydration Goal 1, SLP) (P)  n/a  -KB        Progress/Outcomes (Oral Nutrition/Hydration Goal 1, SLP) (P)  goal ongoing  -KB           Dysphagia Treatment Objectives and Progress    Dysphagia Treatment Objectives (P)  Improve oral skills;Improve timing of pharyngeal response  -KB          User Key  (r) = Recorded By, (t) = Taken By, (c) = Cosigned By    Initials Name Effective Dates    KRISTIN Joyce, Speech Therapy Student 03/02/18 -         EDUCATION  The patient has been educated in the following  areas:   Dysphagia (Swallowing Impairment).    SLP Recommendation and Plan  SLP Swallowing Diagnosis: (P) severe, oral dysfunction, suspected pharyngeal dysfunction  SLP Diet Recommendation: (P) puree, honey thick liquids, ice chips between meals after oral care, with supervision  Recommended Precautions and Strategies: (P) upright posture during/after eating, small bites of food and sips of liquid     Monitor for Signs of Aspiration: (P) yes, cough, gurgly voice, throat clearing, notify SLP if any concerns     Criteria for Skilled Therapeutic Interventions Met: (P) demonstrates skilled criteria  Anticipated Dischage Disposition: (P) home with assist, home with home health  Rehab Potential/Prognosis, Swallowing: (P) adequate, monitor progress closely  Therapy Frequency (SLP): (P) at least, 3 days per week  Predicted Duration Therapy Intervention (Days): (P) until discharge       Plan of Care Reviewed With: (P) patient, daughter  Plan of Care Review  Plan of Care Reviewed With: (P) patient, daughter  Progress: (P) improving  Outcome Summary: (P) Completed CSE. Pt was alert upon entry to room. Presented pt with puree, honey-thick, nectar thick and thin trials. Pt did not show any overt s/s of aspiration but did have difficulty with bolus manipulation and transit. When presented oral trials pt presented with difficulty accepting bolus and demonstrated delayed manipulation and transit time. The daughter was in the room during eval and reported that family/caregivers were planning to change diet consistencies at home. Educated daughter on safety concerns/diet modification recommendations. At this time ST has concerns about cognitive status in relation to bolus manipulation. ST recommends puree and honey-thick liquids. ST to follow and tx.          SLP GOALS     Row Name 03/26/18 0853 03/25/18 1140 03/24/18 1435       Oral Nutrition/Hydration Goal 1 (SLP)    Oral Nutrition/Hydration Goal 1, SLP (P)  --   Pt will tolerate  LRD w/o any overt s/s of aspiration  -KB Pt will toelrate LRD w/o any overt s/s of aspiration.  -CS Pt will tolerate LRD w/o any overt s/s of aspiration.  -CS    Time Frame (Oral Nutrition/Hydration Goal 1, SLP) (P)  short term goal (STG);by discharge  -KB by discharge  -CS by discharge  -CS    Barriers (Oral Nutrition/Hydration Goal 1, SLP) (P)  n/a  -KB  --  --    Progress/Outcomes (Oral Nutrition/Hydration Goal 1, SLP) (P)  goal ongoing  -KB goal ongoing  -CS goal ongoing  -CS       Labial Strengthening Goal 1 (SLP)    Activity (Labial Strengthening Goal 1, SLP) (P)  increase labial tone  -KB  --  --    Increase Labial Tone (P)  labial resistance exercises;swallow trials  -KB  --  --    Dubois/Accuracy (Labial Strengthening Goal 1, SLP) (P)  independently (over 90% accuracy)  -KB  --  --    Time Frame (Labial Strengthening Goal 1, SLP) (P)  short term goal (STG);by discharge  -KB  --  --    Barriers (Labial Strengthening Goal 1, SLP) (P)  cognition  -KB  --  --    Progress/Outcomes (Labial Strengthening Goal 1, SLP) (P)  goal ongoing  -KB  --  --       Pharyngeal Strengthening Exercise Goal 1 (SLP)    Activity (Pharyngeal Strengthening Goal 1, SLP) (P)  increase timing  -KB  --  --    Increase Timing (P)  gustatory stimulation (sour/cold);hard effortful swallow  -KB  --  --    Dubois/Accuracy (Pharyngeal Strengthening Goal 1, SLP) (P)  independently (over 90% accuracy)  -KB  --  --    Time Frame (Pharyngeal Strengthening Goal 1, SLP) (P)  short term goal (STG);by discharge  -KB  --  --    Barriers (Pharyngeal Strengthening Goal 1, SLP) (P)  cognition  -KB  --  --    Progress/Outcomes (Pharyngeal Strengthening Goal 1, SLP) (P)  goal ongoing  -KB  --  --      User Key  (r) = Recorded By, (t) = Taken By, (c) = Cosigned By    Initials Name Provider Type    CS David Cazares MS CCC-SLP Speech and Language Pathologist    KRISTIN Joyce, Speech Therapy Student Speech Therapy Student             SLP Outcome  Measures (last 72 hours)      SLP Outcome Measures     Row Name 03/26/18 0853             SLP Outcome Measures    Outcome Measure Used? (P)  Adult NOMS  -KB         FCM Scores    FCM Chosen (P)  Swallowing  -KB      Swallowing FCM Score (P)  4  -KB        User Key  (r) = Recorded By, (t) = Taken By, (c) = Cosigned By    Initials Name Effective Dates    KRISTIN Selin Joyec Speech Therapy Student 03/02/18 -            Time Calculation:         Time Calculation- SLP     Row Name 03/26/18 1015             Time Calculation- SLP    SLP Start Time (P)  0853  -KB      SLP Stop Time (P)  1013  -KB      SLP Time Calculation (min) (P)  80 min  -KB      SLP Received On (P)  03/26/18  -KB      SLP Goal Re-Cert Due Date (P)  04/05/18  -KB        User Key  (r) = Recorded By, (t) = Taken By, (c) = Cosigned By    Initials Name Provider Type    KRISTIN Joyce Speech Therapy Student Speech Therapy Student          Therapy Charges for Today     Code Description Service Date Service Provider Modifiers Qty    [id number] HC ST EVAL ORAL PHARYNG SWALLOW 5 3/26/2018 Selin Joyce Speech Therapy Student GN 1          SLP G-Codes  SLP NOMS Used?: (P) Yes  Functional Limitations: (P) Swallowing  Swallow Current Status (): (P) At least 20 percent but less than 40 percent impaired, limited or restricted  Swallow Goal Status (): (P) At least 1 percent but less than 20 percent impaired, limited or restricted    Selin Joyce Speech Therapy Student  3/26/2018

## 2018-03-26 NOTE — PROGRESS NOTES
[facility] Medicine Services  INPATIENT PROGRESS NOTE    Length of Stay: 3  Date of Admission: 3/22/2018  Primary Care Physician: Addi Bishop DO    Subjective   Chief Complaint: SOA  HPI   Doing ok  Tolerating room air.  Not much cough  Not eating much  Family wants to know about Hospice        Review of Systems   Unable to perform ROS: Dementia        All pertinent negatives and positives are as above. All other systems have been reviewed and are negative unless otherwise stated.     Objective    Temp:  [96.9 °F (36.1 °C)-98.8 °F (37.1 °C)] 98.8 °F (37.1 °C)  Heart Rate:  [61-84] 84  Resp:  [16-18] 18  BP: (118-149)/(42-87) 149/87  Physical Exam   Constitutional: He appears well-developed and well-nourished.   HENT:   Head: Normocephalic and atraumatic.   Right Ear: External ear normal.   Left Ear: External ear normal.   Nose: Nose normal.   Mouth/Throat: Oropharynx is clear and moist.   Eyes: Conjunctivae and EOM are normal. Pupils are equal, round, and reactive to light. Right eye exhibits no discharge. Left eye exhibits no discharge. No scleral icterus.   Neck: Normal range of motion. Neck supple. No tracheal deviation present. No thyromegaly present.   Cardiovascular: Normal rate, regular rhythm, normal heart sounds and intact distal pulses.  Exam reveals no gallop and no friction rub.    No murmur heard.  Pulmonary/Chest: Effort normal and breath sounds normal. No stridor. No respiratory distress. He has no wheezes. He has no rales. He exhibits no tenderness.   Abdominal: Soft. Bowel sounds are normal. He exhibits no distension and no mass. There is no tenderness. There is no rebound and no guarding. No hernia.   Musculoskeletal: Normal range of motion. He exhibits no edema or deformity.   Lymphadenopathy:     He has no cervical adenopathy.   Neurological: He is alert. He has normal reflexes. He displays normal reflexes. No cranial nerve deficit. He exhibits normal  muscle tone. Coordination normal.   Skin: Skin is warm and dry. No rash noted. No erythema. No pallor.   Psychiatric: He has a normal mood and affect. His behavior is normal. Judgment and thought content normal. Cognition and memory are impaired.   Vitals reviewed.        Results Review:  I have reviewed the labs, radiology results, and diagnostic studies.    Laboratory Data:     Results from last 7 days  Lab Units 03/26/18  0628 03/25/18  0636 03/24/18  0510   WBC 10*3/mm3 6.31 9.07 9.94   HEMOGLOBIN g/dL 11.9* 12.1* 12.9*   HEMATOCRIT % 36.0* 37.9* 39.3*   PLATELETS 10*3/mm3 184 192 184          Results from last 7 days  Lab Units 03/26/18  0628 03/25/18  0636 03/24/18  0510   SODIUM mmol/L 140 143 145   POTASSIUM mmol/L 3.6 3.8 3.8   CHLORIDE mmol/L 101 104 105   CO2 mmol/L 29.0 30.0 28.0   BUN mg/dL 25* 24* 26*   CREATININE mg/dL 1.18 1.19 1.07   CALCIUM mg/dL 7.9* 8.3* 8.6   BILIRUBIN mg/dL 0.4 0.7 0.6   ALK PHOS U/L 57 63 63   ALT (SGPT) U/L 17 22 27   AST (SGOT) U/L 19 24 32   GLUCOSE mg/dL 84 93 114*       Culture Data:   Blood Culture   Date Value Ref Range Status   03/23/2018 No growth at 3 days  Preliminary   03/23/2018 No growth at 3 days  Preliminary   03/23/2018 No growth at 3 days  Preliminary   03/23/2018 No growth at 3 days  Preliminary   03/22/2018 No growth at 3 days  Preliminary       Radiology Data:   Imaging Results (last 24 hours)     ** No results found for the last 24 hours. **          I have reviewed the patient current medications.     Assessment/Plan   1.  Metabolic Encephalopathy  -Resolved    2. Pneumonia  -Rocephin  -Clindamycin  -Nebs    3.  CAD  -ASA  -Coreg    4.  Dementia  -Namenda    5.  H/o DVT  -Eliquis    6.  Essential HTN  -Coreg    7. Failure to thrive  -Hospice consulted  -Continue IVF    8.  GERD  -Prilosec    9.  Gout  -Allopurinol      Discharge Planning: I expect the patient to be discharged to home with hospice in 2-3 days  Farhan Perales MD   03/26/18   6:28 PM

## 2018-03-26 NOTE — PLAN OF CARE
Problem: Patient Care Overview  Goal: Plan of Care Review  Outcome: Ongoing (interventions implemented as appropriate)   03/26/18 0458   Coping/Psychosocial   Plan of Care Reviewed With patient   Plan of Care Review   Progress improving   OTHER   Outcome Summary Patient more alert as reported by family and sitter. During assessment, patient still not answering open ended questions and following all commands. Repositioned for skin care and pain. Patient becomes agitated at times and will refuse Vital signs. Will continue to monitor.       Problem: Skin Injury Risk (Adult)  Goal: Skin Health and Integrity  Outcome: Ongoing (interventions implemented as appropriate)      Problem: Confusion, Acute (Adult)  Goal: Cognitive/Functional Impairments Minimized  Outcome: Ongoing (interventions implemented as appropriate)    Goal: Safety  Outcome: Ongoing (interventions implemented as appropriate)      Problem: Infection, Risk/Actual (Adult)  Goal: Infection Prevention/Resolution  Outcome: Ongoing (interventions implemented as appropriate)      Problem: Fall Risk (Adult)  Goal: Absence of Fall  Outcome: Ongoing (interventions implemented as appropriate)

## 2018-03-26 NOTE — PROGRESS NOTES
Nutrition Services    Patient Name:  Chiam Mackey  YOB: 1927  MRN: [id number]  Admit Date:  3/22/2018    Mr. Mackey is NPO x3 days today.  SLP note from yesterday recommended Pt to be NPO.  If diet is unable to be initiated and Pt/family desire aggressive treatment, Pt may benefit from initiation of enteral nutrition support.  RDN will continue to follow and make further recommendations as needed. Thanks.     Electronically signed by:  Sandy Rosales RDN, DUNIA  03/26/18 9:14 AM

## 2018-03-26 NOTE — PROGRESS NOTES
Pneumonia information left at bedside for family. Patient is not able to understand due to AMS and alzheimers/dementia.

## 2018-03-27 ENCOUNTER — APPOINTMENT (OUTPATIENT)
Dept: GENERAL RADIOLOGY | Facility: HOSPITAL | Age: 83
End: 2018-03-27

## 2018-03-27 VITALS
RESPIRATION RATE: 16 BRPM | DIASTOLIC BLOOD PRESSURE: 68 MMHG | SYSTOLIC BLOOD PRESSURE: 148 MMHG | OXYGEN SATURATION: 95 % | HEART RATE: 76 BPM | HEIGHT: 64 IN | WEIGHT: 150.38 LBS | BODY MASS INDEX: 25.67 KG/M2 | TEMPERATURE: 98.4 F

## 2018-03-27 LAB
ALBUMIN SERPL-MCNC: 3.3 G/DL (ref 3.5–5)
ALBUMIN/GLOB SERPL: 1 G/DL (ref 1.1–2.5)
ALP SERPL-CCNC: 76 U/L (ref 24–120)
ALT SERPL W P-5'-P-CCNC: 24 U/L (ref 0–54)
ANION GAP SERPL CALCULATED.3IONS-SCNC: 10 MMOL/L (ref 4–13)
AST SERPL-CCNC: 32 U/L (ref 7–45)
BASOPHILS # BLD AUTO: 0.04 10*3/MM3 (ref 0–0.2)
BASOPHILS NFR BLD AUTO: 0.6 % (ref 0–2)
BILIRUB SERPL-MCNC: 0.4 MG/DL (ref 0.1–1)
BUN BLD-MCNC: 23 MG/DL (ref 5–21)
BUN/CREAT SERPL: 20.4 (ref 7–25)
CALCIUM SPEC-SCNC: 8.3 MG/DL (ref 8.4–10.4)
CHLORIDE SERPL-SCNC: 101 MMOL/L (ref 98–110)
CO2 SERPL-SCNC: 31 MMOL/L (ref 24–31)
CREAT BLD-MCNC: 1.13 MG/DL (ref 0.5–1.4)
DEPRECATED RDW RBC AUTO: 42.5 FL (ref 40–54)
EOSINOPHIL # BLD AUTO: 0.32 10*3/MM3 (ref 0–0.7)
EOSINOPHIL NFR BLD AUTO: 4.8 % (ref 0–4)
ERYTHROCYTE [DISTWIDTH] IN BLOOD BY AUTOMATED COUNT: 13.4 % (ref 12–15)
GFR SERPL CREATININE-BSD FRML MDRD: 61 ML/MIN/1.73
GLOBULIN UR ELPH-MCNC: 3.2 GM/DL
GLUCOSE BLD-MCNC: 88 MG/DL (ref 70–100)
HCT VFR BLD AUTO: 39.2 % (ref 40–52)
HGB BLD-MCNC: 13.1 G/DL (ref 14–18)
IMM GRANULOCYTES # BLD: 0.04 10*3/MM3 (ref 0–0.03)
IMM GRANULOCYTES NFR BLD: 0.6 % (ref 0–5)
LYMPHOCYTES # BLD AUTO: 0.88 10*3/MM3 (ref 0.72–4.86)
LYMPHOCYTES NFR BLD AUTO: 13.1 % (ref 15–45)
MCH RBC QN AUTO: 29.2 PG (ref 28–32)
MCHC RBC AUTO-ENTMCNC: 33.4 G/DL (ref 33–36)
MCV RBC AUTO: 87.3 FL (ref 82–95)
MONOCYTES # BLD AUTO: 0.62 10*3/MM3 (ref 0.19–1.3)
MONOCYTES NFR BLD AUTO: 9.2 % (ref 4–12)
NEUTROPHILS # BLD AUTO: 4.81 10*3/MM3 (ref 1.87–8.4)
NEUTROPHILS NFR BLD AUTO: 71.7 % (ref 39–78)
NRBC BLD MANUAL-RTO: 0 /100 WBC (ref 0–0)
PLATELET # BLD AUTO: 233 10*3/MM3 (ref 130–400)
PMV BLD AUTO: 9.9 FL (ref 6–12)
POTASSIUM BLD-SCNC: 3.9 MMOL/L (ref 3.5–5.3)
PROT SERPL-MCNC: 6.5 G/DL (ref 6.3–8.7)
RBC # BLD AUTO: 4.49 10*6/MM3 (ref 4.8–5.9)
SODIUM BLD-SCNC: 142 MMOL/L (ref 135–145)
WBC NRBC COR # BLD: 6.71 10*3/MM3 (ref 4.8–10.8)

## 2018-03-27 PROCEDURE — 94799 UNLISTED PULMONARY SVC/PX: CPT

## 2018-03-27 PROCEDURE — G8997 SWALLOW GOAL STATUS: HCPCS

## 2018-03-27 PROCEDURE — 25010000002 CEFTRIAXONE PER 250 MG: Performed by: INTERNAL MEDICINE

## 2018-03-27 PROCEDURE — 97161 PT EVAL LOW COMPLEX 20 MIN: CPT

## 2018-03-27 PROCEDURE — 92526 ORAL FUNCTION THERAPY: CPT

## 2018-03-27 PROCEDURE — G8998 SWALLOW D/C STATUS: HCPCS

## 2018-03-27 PROCEDURE — G8982 BODY POS GOAL STATUS: HCPCS

## 2018-03-27 PROCEDURE — 85025 COMPLETE CBC W/AUTO DIFF WBC: CPT | Performed by: FAMILY MEDICINE

## 2018-03-27 PROCEDURE — G8981 BODY POS CURRENT STATUS: HCPCS

## 2018-03-27 PROCEDURE — 80053 COMPREHEN METABOLIC PANEL: CPT | Performed by: FAMILY MEDICINE

## 2018-03-27 PROCEDURE — 94760 N-INVAS EAR/PLS OXIMETRY 1: CPT

## 2018-03-27 RX ORDER — DOCUSATE SODIUM 100 MG/1
100 CAPSULE, LIQUID FILLED ORAL 2 TIMES DAILY
Status: DISCONTINUED | OUTPATIENT
Start: 2018-03-27 | End: 2018-03-27 | Stop reason: HOSPADM

## 2018-03-27 RX ORDER — CLINDAMYCIN HYDROCHLORIDE 300 MG/1
300 CAPSULE ORAL 4 TIMES DAILY
Qty: 20 CAPSULE | Refills: 0 | Status: SHIPPED | OUTPATIENT
Start: 2018-03-27

## 2018-03-27 RX ORDER — POLYVINYL ALCOHOL 14 MG/ML
2 SOLUTION/ DROPS OPHTHALMIC
Status: DISCONTINUED | OUTPATIENT
Start: 2018-03-27 | End: 2018-03-27 | Stop reason: HOSPADM

## 2018-03-27 RX ADMIN — FAMOTIDINE 20 MG: 10 INJECTION INTRAVENOUS at 09:16

## 2018-03-27 RX ADMIN — POLYVINYL ALCOHOL 2 DROP: 14 SOLUTION/ DROPS OPHTHALMIC at 09:49

## 2018-03-27 RX ADMIN — IPRATROPIUM BROMIDE AND ALBUTEROL SULFATE 3 ML: 2.5; .5 SOLUTION RESPIRATORY (INHALATION) at 06:10

## 2018-03-27 RX ADMIN — METOROPROLOL TARTRATE 2.5 MG: 5 INJECTION, SOLUTION INTRAVENOUS at 04:57

## 2018-03-27 RX ADMIN — ASPIRIN 81 MG: 81 TABLET ORAL at 09:04

## 2018-03-27 RX ADMIN — MEMANTINE HYDROCHLORIDE 5 MG: 5 TABLET, FILM COATED ORAL at 09:02

## 2018-03-27 RX ADMIN — CEFTRIAXONE SODIUM 1 G: 1 INJECTION, POWDER, FOR SOLUTION INTRAMUSCULAR; INTRAVENOUS at 04:09

## 2018-03-27 RX ADMIN — DOCUSATE SODIUM 100 MG: 100 CAPSULE, LIQUID FILLED ORAL at 09:03

## 2018-03-27 RX ADMIN — CARVEDILOL 12.5 MG: 6.25 TABLET, FILM COATED ORAL at 09:03

## 2018-03-27 RX ADMIN — CLINDAMYCIN PHOSPHATE 900 MG: 18 INJECTION, SOLUTION INTRAVENOUS at 04:09

## 2018-03-27 RX ADMIN — IPRATROPIUM BROMIDE AND ALBUTEROL SULFATE 3 ML: 2.5; .5 SOLUTION RESPIRATORY (INHALATION) at 10:30

## 2018-03-27 RX ADMIN — IPRATROPIUM BROMIDE AND ALBUTEROL SULFATE 3 ML: 2.5; .5 SOLUTION RESPIRATORY (INHALATION) at 14:42

## 2018-03-27 NOTE — DISCHARGE SUMMARY
"    [facility]  DISCHARGE SUMMARY       Date of Admission: 3/22/2018  Date of Discharge:  3/27/2018  Primary Care Physician: Addi Bishop DO    Presenting Problem/History of Present Illness:  Altered mental status, unspecified altered mental status type [R41.82]     Final Discharge Diagnoses:  1.  Metabolic Encephalopathy  -Resolved     2. Pneumonia  -Rocephin  -Clindamycin  -Nebs     3.  CAD  -ASA  -Coreg     4.  Dementia  -Namenda     5.  H/o DVT  -Eliquis     6.  Essential HTN  -Coreg     7. Failure to thrive  -Hospice consulted  -Continue IVF     8.  GERD  -Prilosec     9.  Gout  -Allopurinol       Consults: Hospice    Procedures Performed: NA    Pertinent Test Results: NA    Chief Complaint on Day of Discharge: Confused    History of Present Illness on Day of Discharge:   Did ok overnight.  Not eating or drinking much.  Family wants to go home with hospice.      Hospital Course:  The patient is a 89 y.o. male who presented to [facility] with increasing confusion, found to have pneumonia.  He was thought to be aspirating due to swallowing difficulties.  He was treated with IV antibiotics.  Speech worked with him. He was placed on a honey thick pureed diet.  He improved clinically.  His confusion is at baseline per his family.  I spoke at length with his daughter about his worsening dementia and his poor long term prognosis.  At the time of discharge, they are comfortable with being discharged and with the discharge plan.  They were given the chance to ask questions and all of his questions were answered to his complete satisfaction.      Condition on Discharge:  Stable    Physical Exam on Discharge:  /66 (BP Location: Left arm, Patient Position: Lying)   Pulse 75   Temp 98 °F (36.7 °C) (Temporal Artery )   Resp 16   Ht 162.6 cm (64\")   Wt 68.2 kg (150 lb 6 oz)   SpO2 96%   BMI 25.81 kg/m²   Physical Exam   Constitutional: He appears " well-developed and well-nourished.   HENT:   Head: Normocephalic and atraumatic.   Right Ear: External ear normal.   Left Ear: External ear normal.   Nose: Nose normal.   Mouth/Throat: Oropharynx is clear and moist.   Eyes: Conjunctivae and EOM are normal. Pupils are equal, round, and reactive to light. Right eye exhibits no discharge. Left eye exhibits no discharge. No scleral icterus.   Neck: Normal range of motion. Neck supple. No tracheal deviation present. No thyromegaly present.   Cardiovascular: Normal rate, regular rhythm, normal heart sounds and intact distal pulses.  Exam reveals no gallop and no friction rub.    No murmur heard.  Pulmonary/Chest: Effort normal and breath sounds normal. No stridor. No respiratory distress. He has no wheezes. He has no rales. He exhibits no tenderness.   Abdominal: Soft. Bowel sounds are normal. He exhibits no distension and no mass. There is no tenderness. There is no rebound and no guarding. No hernia.   Musculoskeletal: Normal range of motion. He exhibits no edema or deformity.   Lymphadenopathy:     He has no cervical adenopathy.   Neurological: He is alert. He has normal reflexes. He is disoriented. He displays normal reflexes. No cranial nerve deficit. He exhibits normal muscle tone. Coordination normal.   Skin: Skin is warm and dry. No rash noted. No erythema. No pallor.   Psychiatric: He has a normal mood and affect. His behavior is normal. Judgment and thought content normal. Cognition and memory are impaired.   Vitals reviewed.      Discharge Disposition:  Home  Hospice/Home    Discharge Medications:   Chaim Mackey   Home Medication Instructions RADHA:[id number]    Printed on:03/27/18 3460   Medication Information                      allopurinol (ZYLOPRIM) 100 MG tablet  Take 200 mg by mouth Daily.             aspirin 81 MG chewable tablet  Chew 81 mg Daily.             carvedilol (COREG) 12.5 MG tablet  Take 12.5 mg by mouth 2 (Two) Times a Day With  Meals.             clindamycin (CLEOCIN) 300 MG capsule  Take 1 capsule by mouth 4 (Four) Times a Day.             digoxin (LANOXIN) 125 MCG tablet  Take 125 mcg by mouth Daily.             ELIQUIS 5 MG tablet tablet  TAKE 1 TABLET BY MOUTH TWICE A DAY             ipratropium-albuterol (DUO-NEB) 0.5-2.5 mg/mL nebulizer  Take 3 mL by nebulization Every 4 (Four) Hours As Needed for Wheezing.             memantine (NAMENDA XR) 28 MG capsule sustained-release 24 hr extended release capsule  Take 28 mg by mouth Daily.             niacin 100 MG tablet  Take 100 mg by mouth Daily With Breakfast.             OLANZapine (zyPREXA) 5 MG tablet  Take 5 mg by mouth Every Night.             omeprazole (priLOSEC) 20 MG capsule  Take 40 mg by mouth Daily.                 Discharge Diet: Pureed honey thick    Activity at Discharge: as tolerated    Discharge Care Plan/Instructions: follow up with PCP, Hospice    Follow-up Appointments:   No future appointments.    Test Results Pending at Discharge: HENRRY Perales MD  03/27/18  2:55 PM    Time: 40 minutes

## 2018-03-27 NOTE — PROGRESS NOTES
Continued Stay Note  BERTIN Nickerson     Patient Name: Chaim Mackey  MRN: [id number]  Today's Date: 3/27/2018    Admit Date: 3/22/2018          Discharge Plan     Row Name 03/27/18 1254       Plan    Plan [facility]    Patient/Family in Agreement with Plan yes    Plan Comments Camila Anderson from [facility] has met with dtr and all set up for pt to be discharged with [facility]. She can go home when MD discharges.               Discharge Codes    No documentation.           OUMAR Rodriguez

## 2018-03-27 NOTE — THERAPY DISCHARGE NOTE
Acute Care - Speech Language Pathology   Swallow Treatment Note/Discharge   [facility]     Patient Name: Chaim Mackey  : 10/16/1927  MRN: [id number]  Today's Date: 3/27/2018  Onset of Illness/Injury or Date of Surgery: 18     Referring Physician: Dr. Collier      Admit Date: 3/22/2018  Planned to completed MBS to further assess swallow function today, however charge nurse indicated pt's family decided for the pt to go home with hospice. Spoke with pt's family and they do wish to continue with diet modification of pureed foods and honey thick liquids. SLP provided information and education on diet modifications as well as samples of starch and gel based thickeners. SLP will sign off.  MARISOL Jennings 3/27/2018 3:21 PM    Visit Dx:      ICD-10-CM ICD-9-CM   1. Altered mental status, unspecified altered mental status type R41.82 780.97   2. Pneumonia of right lung due to infectious organism, unspecified part of lung J18.9 483.8   3. Dysphagia, unspecified type R13.10 787.20   4. Impaired functional mobility, balance, gait, and endurance Z74.09 V49.89     Patient Active Problem List   Diagnosis   • Febrile illness   • Viral syndrome   • Systemic inflammatory response syndrome   • Altered mental status   • Pneumonia   • Dementia       Therapy Treatment    Therapy Treatment / Health Promotion    Treatment Time/Intention  Discipline: speech language pathologist (18 1420 : MARISOL Lopez)  Document Type: therapy note (daily note) (18 1420 : MARISOL Lopez)  Subjective Information: no complaints (18 1420 : MARISOL Lopez)  Mode of Treatment: speech-language pathology (18 0913 : MARISOL Lopez)  Patient Effort: adequate (18 0913 : MARISOL Lopez)  Plan of Care Review  Plan of Care Reviewed With: patient, family (18 1013 : Marilla B Gonzalez, CCC-SLP)    Vitals/Pain/Safety  Pain Assessment  Additional  Documentation: Pain Scale: FACES Pre/Post-Treatment (Group) (03/27/18 0913 : MARISOL Lopez)  Pain Scale: FACES Pre/Post-Treatment  Pain: FACES Scale, Pretreatment: 0-->no hurt (03/27/18 0913 : MARISOL Lopez)    Cognition, Communication, Swallow  Recommendations  Anticipated Dischage Disposition: home, other (see comments) (with hopsice) (03/27/18 1517 : MARISOL Lopez)    Outcome Summary  Outcome Summary/Treatment Plan (SLP)  Daily Summary of Progress (SLP): prepare for discharge (03/27/18 1420 : MARISOL Lopez)  Barriers to Overall Progress (SLP): impaired cognition (03/27/18 1420 : MRAISOL Lopez)  Plan for Continued Treatment (SLP): discharge with hospice (03/27/18 1420 : MARISOL Lopez)  Anticipated Dischage Disposition: home, other (see comments) (with hopsice) (03/27/18 1517 : MARISOL Lopez)  Reason for Discharge: other (see comments) (Pt going home with hospice) (03/27/18 1517 : MARISOL Lopez)        SLP GOALS     Row Name 03/27/18 1420 03/27/18 0913 03/26/18 0853       Oral Nutrition/Hydration Goal 1 (SLP)    Oral Nutrition/Hydration Goal 1, SLP Pt will tolerate least restricitve diet with no overt s/s of aspiration.  -MB Pt will tolerate least restricitve diet with no overt s/s of aspiration.  -MB --   Pt will tolerate LRD w/o any overt s/s of aspiration  -TM (r) KB (t) TM (c)    Time Frame (Oral Nutrition/Hydration Goal 1, SLP) short term goal (STG);by discharge  -MB short term goal (STG);by discharge  -MB short term goal (STG);by discharge  -TM (r) KB (t) TM (c)    Barriers (Oral Nutrition/Hydration Goal 1, SLP) impaired cognition  -MB impaired cognition  -MB n/a  -TM (r) KB (t) TM (c)    Progress/Outcomes (Oral Nutrition/Hydration Goal 1, SLP) goal no longer appropriate  -MB continuing progress toward goal;goal ongoing  -MB goal ongoing  -TM (r) KB (t) TM (c)       Labial Strengthening Goal 1 (SLP)     Activity (Labial Strengthening Goal 1, SLP) increase labial tone  -MB increase labial tone  -MB increase labial tone  -TM (r) KB (t) TM (c)    Increase Labial Tone labial resistance exercises  -MB labial resistance exercises  -MB labial resistance exercises;swallow trials  -TM (r) KB (t) TM (c)    [place]/Accuracy (Labial Strengthening Goal 1, SLP) independently (over 90% accuracy)  -MB independently (over 90% accuracy)  -MB independently (over 90% accuracy)  -TM (r) KB (t) TM (c)    Time Frame (Labial Strengthening Goal 1, SLP) short term goal (STG);by discharge  -MB short term goal (STG);by discharge  -MB short term goal (STG);by discharge  -TM (r) KB (t) TM (c)    Barriers (Labial Strengthening Goal 1, SLP) cognition  -MB  -- cognition  -TM (r) KB (t) TM (c)    Progress/Outcomes (Labial Strengthening Goal 1, SLP) goal no longer appropriate  -MB goal ongoing  -MB goal ongoing  -TM (r) KB (t) TM (c)       Pharyngeal Strengthening Exercise Goal 1 (SLP)    Activity (Pharyngeal Strengthening Goal 1, SLP) increase timing  -MB increase timing  -MB increase timing  -TM (r) KB (t) TM (c)    Increase Timing gustatory stimulation (sour/cold)  -MB gustatory stimulation (sour/cold)  -MB gustatory stimulation (sour/cold);hard effortful swallow  -TM (r) KB (t) TM (c)    [place]/Accuracy (Pharyngeal Strengthening Goal 1, SLP) independently (over 90% accuracy)  -MB independently (over 90% accuracy)  -MB independently (over 90% accuracy)  -TM (r) KB (t) TM (c)    Time Frame (Pharyngeal Strengthening Goal 1, SLP) short term goal (STG);by discharge  -MB short term goal (STG);by discharge  -MB short term goal (STG);by discharge  -TM (r) KB (t) TM (c)    Barriers (Pharyngeal Strengthening Goal 1, SLP) impaired cognition  -MB impaired cognition  -MB cognition  -TM (r) KB (t) TM (c)    Progress/Outcomes (Pharyngeal Strengthening Goal 1, SLP) goal no longer appropriate  -MB unable to make needed progress  -MB goal ongoing  -TM  (r) KB (t) TM (c)    Row Name 03/25/18 1140             Oral Nutrition/Hydration Goal 1 (SLP)    Oral Nutrition/Hydration Goal 1, SLP Pt will toelrate LRD w/o any overt s/s of aspiration.  -CS      Time Frame (Oral Nutrition/Hydration Goal 1, SLP) by discharge  -CS      Progress/Outcomes (Oral Nutrition/Hydration Goal 1, SLP) goal ongoing  -CS        User Key  (r) = Recorded By, (t) = Taken By, (c) = Cosigned By    Initials Name Provider Type    BEVERLEY Gonzalez, CCC-SLP Speech and Language Pathologist     Valentina Tovar, CCC-SLP Speech and Language Pathologist    KODY Cazares, MS CCC-SLP Speech and Language Pathologist    KRISTIN Joyce, Speech Therapy Student Speech Therapy Student          EDUCATION  The patient has been educated in the following areas:   Dysphagia (Swallowing Impairment).    SLP Recommendation and Plan                    Anticipated Dischage Disposition: home, other (see comments) (with hopsice)                Daily Summary of Progress (SLP): prepare for discharge  Plan for Continued Treatment (SLP): discharge with hospice  Plan of Care Reviewed With: patient, family  Progress: improving  Plan of Care Reviewed With: patient, family       SLP Outcome Measures (last 72 hours)      SLP Outcome Measures     Row Name 03/27/18 1500 03/26/18 0853          SLP Outcome Measures    Outcome Measure Used? Adult NOMS  -MB Adult NOMS  -TM (r) KB (t) TM (c)        FCM Scores    FCM Chosen Swallowing  -MB Swallowing  -TM (r) KB (t) TM (c)     Swallowing FCM Score 3  -MB 4  -TM (r) KB (t) TM (c)       User Key  (r) = Recorded By, (t) = Taken By, (c) = Cosigned By    Initials Name Effective Dates    BEVERLEY Gonzalez, [facility]-SLP 08/02/16 -     TM Valentina Tovar CCC-SLP 08/02/16 -     KRISTIN Joyce, Speech Therapy Student 03/02/18 -              Time Calculation:         Time Calculation- SLP     Row Name 03/27/18 1518 03/27/18 1019          Time Calculation- SLP    SLP Start Time 1420  -MB 0913   -MB     SLP Stop Time 1441  -MB 0944  -MB     SLP Time Calculation (min) 21 min  -MB 31 min  -MB     SLP Received On 03/27/18  -MB 03/27/18  -MB       User Key  (r) = Recorded By, (t) = Taken By, (c) = Cosigned By    Initials Name Provider Type    MARISOL Porter Speech and Language Pathologist          Therapy Charges for Today     Code Description Service Date Service Provider Modifiers Qty    01762113034 HC ST TREATMENT SWALLOW 2 3/27/2018 ABDIRAHMAN LopezSLP GN 1    [id number] HC ST TREATMENT SWALLOW 1 3/27/2018 MARISOL Lopez GN 1    [id number] HC ST SWALLOWING PROJECTED 3/27/2018 MARISOL Lopez GN, CI 1    [id number] HC ST SWALLOWING DISCHARGE 3/27/2018 MARISOL Lopez, CL 1          SLP G-Codes  SLP NOMS Used?: Yes  Functional Limitations: Swallowing  Swallow Current Status (): At least 20 percent but less than 40 percent impaired, limited or restricted  Swallow Goal Status (): At least 1 percent but less than 20 percent impaired, limited or restricted  Swallow Discharge Status (): At least 60 percent but less than 80 percent impaired, limited or restricted    SLP Discharge Summary  Anticipated Dischage Disposition: home, other (see comments) (with hopsice)  Reason for Discharge: other (see comments) (Pt going home with hospice)  Progress Toward Achieving Short/long Term Goals: goals partially met within established timelines  Discharge Destination: home, other (see comments) (Pt going home with hospice)    MARISOL Jennings  3/27/2018

## 2018-03-27 NOTE — THERAPY EVALUATION
Acute Care - Physical Therapy Initial Evaluation  [facility]     Patient Name: Chaim Mackey  : 10/16/1927  MRN: [id number]  Today's Date: 3/27/2018   Onset of Illness/Injury or Date of Surgery: 18  Date of Referral to PT: 18  Referring Physician: Dr. Collier      Admit Date: 3/22/2018    Visit Dx:     ICD-10-CM ICD-9-CM   1. Altered mental status, unspecified altered mental status type R41.82 780.97   2. Pneumonia of right lung due to infectious organism, unspecified part of lung J18.9 483.8   3. Dysphagia, unspecified type R13.10 787.20   4. Impaired functional mobility, balance, gait, and endurance Z74.09 V49.89     Patient Active Problem List   Diagnosis   • Febrile illness   • Viral syndrome   • Systemic inflammatory response syndrome   • Altered mental status   • Pneumonia   • Dementia     Past Medical History:   Diagnosis Date   • Coronary artery disease    • Dementia    • DVT (deep venous thrombosis)    • Gout    • Hypertension    • Myocardial infarction      Past Surgical History:   Procedure Laterality Date   • APPENDECTOMY     • CORONARY ARTERY BYPASS GRAFT     • MASTOID SURGERY     • PACEMAKER IMPLANTATION          PT ASSESSMENT (last 72 hours)      Physical Therapy Evaluation     Row Name 18 1117 18 1007       PT Evaluation Time/Intention    Subjective Information no complaints  -EVELYN (r) CS (t) EVELYN (c)  --    Document Type evaluation  -EVELYN (r) CS (t) EVELYN (c) --  -EVELYN (r) CS (t) EVELYN (c)    Mode of Treatment physical therapy  -EVELYN (r) CS (t) EVELYN (c) physical therapy  -EVELYN (r) CS (t) EVELYN (c)    Evaluation Not Performed  -- other (see comments)  -EVELYN (r) CS (t) EVELYN (c)    Comment: Evaluation Not Performed  -- pt unable to follow commands or answer questions. family reports he was more responsive earlier in the AM, will check back at a later time   -EVELYN (r) CS (t) EVELYN (c)    Row Name 18 1117 18 1007       General Information    Patient Profile Reviewed? yes  -EVELYN (r) CS (t) EVELYN (c)  yes  -EVELYN (r) CS (t) EVELYN (c)    Onset of Illness/Injury or Date of Surgery 03/22/18  -EVELYN (r) CS (t) EVELYN (c) 03/22/18  -EVELYN (r) CS (t) EVELYN (c)    Referring Physician Dr. Collier  -EVELYN (r) CS (t) EVELYN (c) Dr. Collier  -EVELYN (r) CS (t) EVELYN (c)    Patient Observations agree to therapy  -EVELYN  --    Prior Level of Function max assist:;dependent:;all household mobility;ADL's;min assist:;feeding  -EVELYN (r) CS (t) EVELYN (c) max assist:;dependent:;all household mobility;community mobility;ADL's;min assist:;feeding  -EVELYN (r) CS (t) EVELYN (c)    Equipment Currently Used at Home wheelchair;shower chair;grab bar;commode, bedside  -EVELYN (r) KODY (t) EVELYN (c) wheelchair;shower chair;grab bar;commode, bedside  -EVELYN (r) CS (t) EVELYN (c)    Pertinent History of Current Functional Problem 3/22/2018 pt brought in by daughter d/t increase in confusion over past 2 days. Was less conversive and responsive. pt has hx of dementia. pt admitted d/t altered mental status from metabolic encephalopathy, and possible aspiration pneumonia. pt has not been eating and has continued to become weaker; PMH: dementia, DVT, HTN, MI, CABG, pacemaker, mastoid surgery  -EVELYN (r) KODY (t) EVELYN (c) 3/22/2018 pt brought in by daughter d/t increase in confusion over past 2 days. Was less conversive and responsive. pt has hx of dementia. pt admitted d/t altered mental status from metabolic encephalopathy, and possible aspiration pneumonia. pt has not been eating and has continued to become weaker; PMH:  dementia, DVT, HTN, MI, CABG, pacemaker, mastoid surgery  -EVELYN (r) CS (t) EVELYN (c)    Existing Precautions/Restrictions fall  -EVELYN (r) CS (t) EVELYN (c)  --    Limitations/Impairments hearing  -EVELYN (r) CS (t) EVELYN (c) hearing  -EVELYN (r) CS (t) EVELYN (c)    Risks Reviewed patient:;family:;LOB;nausea/vomiting;dizziness;increased discomfort;change in vital signs  -EVELYN (r) CS (t) EVELYN (c)  --    Benefits Reviewed patient:;family:;improve function;increase independence;increase strength;increase balance;decrease pain  -EVELYN (r)  CS (t) EVELYN (c)  --    Barriers to Rehab medically complex;previous functional deficit;cognitive status  -EVELYN (r) CS (t) EVELYN (c)  --    Row Name 03/27/18 1117 03/26/18 1007       Relationship/Environment    Lives With spouse   daughter lives on same property as pt  -EVELYN (r) CS (t) EVELYN (c) spouse;child(jamia), adult  -EVELYN (r) CS (t) EVELYN (c)    Family Caregiver if Needed child(jamia), adult   hired caregivers 24/7   -EVELYN (r) CS (t) EVELYN (c) child(jamia), adult  -EVELYN (r) CS (t) EVELYN (c)    Row Name 03/27/18 1117 03/26/18 1007       Resource/Environmental Concerns    Current Living Arrangements home/apartment/condo  -EVELYN (r) CS (t) EVELYN (c) home/apartment/condo  -EVELYN (r) CS (t) EVELYN (c)    Row Name 03/27/18 1117          Cognitive Assessment/Interventions    Additional Documentation Cognitive Assessment/Intervention (Group)  -EVELYN (r) CS (t) EVELYN (c)     Row Name 03/27/18 1117          Cognitive Assessment/Intervention- PT/OT    Affect/Mental Status (Cognitive) low arousal/lethargic;unresponsive  -EVELYN (r) CS (t) EVELYN (c)     Orientation Status (Cognition) unable/difficult to assess   unable to fully answer  -EVELYN (r) CS (t) EVELYN (c)     Follows Commands (Cognition) follows one step commands;0-24% accuracy;delayed response/completion;increased processing time needed;physical/tactile prompts required;verbal cues/prompting required  -EVELYN (r) CS (t) EVELYN (c)     Safety Deficit (Cognitive) awareness of need for assistance;insight into deficits/self awareness;safety precautions awareness  -EVELYN (r) CS (t) EVELYN (c)     Personal Safety Interventions fall prevention program maintained;gait belt;muscle strengthening facilitated;nonskid shoes/slippers when out of bed  -EVELYN (r) CS (t) EVELYN (c)     Row Name 03/27/18 1117          Safety Issues, Functional Mobility    Safety Issues Affecting Function (Mobility) awareness of need for assistance;insight into deficits/self awareness;safety precaution awareness  -EVELYN (r) CS (t) EVELYN (c)     Impairments Affecting Function (Mobility)  cognition;endurance/activity tolerance;range of motion (ROM);strength;coordination;motor control;motor planning  -EVELYN (r) CS (t) EVELYN (c)     Row Name 03/27/18 1117          Bed Mobility Assessment/Treatment    Bed Mobility Assessment/Treatment rolling left;rolling right  -EVELYN (r) CS (t) EVELYN (c)     Rolling Left [place] (Bed Mobility) maximum assist (25% patient effort);verbal cues  -EVELYN (r) CS (t) EVELYN (c)     Rolling Right [place] (Bed Mobility) maximum assist (25% patient effort);verbal cues  -EVELYN (r) CS (t) EVEYLN (c)     Assistive Device (Bed Mobility) bed rails  -EVELYN (r) CS (t) EVELYN (c)     Row Name 03/27/18 1117          General ROM    GENERAL ROM COMMENTS B PROM hip/knee flexion ~ 25% impaired, B PROM elbow ext ~ 25% impaired, unable to assess shoulder PROM   -EVELYN (r) CS (t) EVELYN (c)     Row Name 03/27/18 1117          General Assessment (Manual Muscle Testing)    Comment, General Manual Muscle Testing (MMT) Assessment unable to formally assess d/t pt cognition, B LE ~ 2-/5, B elbow flexion 3/5  -EVELYN     Row Name 03/26/18 1007          Vision Assessment/Intervention    Visual Impairment/Limitations corrective lenses full time   doesn't wear due to looking over them  -EVELYN (r) CS (t) EVELYN (c)     Row Name 03/27/18 1117          Pain Assessment    Additional Documentation Pain Scale: FACES Pre/Post-Treatment (Group)  -EVELYN (r) CS (t) EVELYN (c)     Row Name 03/26/18 1007          Pain Scale: Numbers Pre/Post-Treatment    Pain Scale: Numbers, Pretreatment 0/10 - no pain  -EVELYN (r) CS (t) EVELYN (c)     Row Name 03/27/18 1117          Pain Scale: FACES Pre/Post-Treatment    Pain: FACES Scale, Pretreatment 0-->no hurt  -EVELYN (r) CS (t) EVELYN (c)     Pain: FACES Scale, Post-Treatment 0-->no hurt  -EVELYN (r) CS (t) EVELYN (c)     Row Name 03/27/18 1117          Health Promotion    Additional Documentation Coping (Group);Plan of Care Review (Group)  -EVELYN (r) CS (t) EVELYN (c)     Row Name 03/27/18 1117          Coping    Observed Emotional State  calm;cooperative  -EVELYN (r) CS (t) EVELYN (c)     Row Name 03/27/18 1117          Plan of Care Review    Plan of Care Reviewed With daughter;patient  -EVELYN (r) CS (t) EVELYN (c)     Row Name 03/27/18 1117 03/26/18 Gloria       Physical Therapy Clinical Impression    Date of Referral to PT 03/23/18  -EVELYN (r) CS (t) EVELYN (c) 03/23/18  -EVELYN (r) CS (t) EVELYN (c)    PT Diagnosis (PT Clinical Impression) impaired mobility  -EVELYN (r) CS (t) EVELYN (c)  --    Patient/Family Goals Statement (PT Clinical Impression) Per PT: Mod A with rolling L/R  -EVELYN (r) CS (t) EVELYN (c)  --    Criteria for Skilled Interventions Met (PT Clinical Impression) yes;treatment indicated  -EVELYN (r) CS (t) EVELYN (c)  --    Pathology/Pathophysiology Noted (Describe Specifically for Each System) musculoskeletal  -EVELYN (r) CS (t) EVELYN (c)  --    Impairments Found (describe specific impairments) aerobic capacity/endurance;arousal, attention, and cognition;motor function;muscle performance;ROM;gait, locomotion, and balance  -EVELYN (r) CS (t) EVELYN (c)  --    Functional Limitations in Following Categories (Describe Specific Limitations) self-care;home management  -EVELYN (r) CS (t) EVELYN (c)  --    Rehab Potential (PT Clinical Summary) fair, will monitor progress closely  -EVELYN (r) CS (t) EVELYN (c)  --    Predicted Duration of Therapy (PT) until d/c  -EVELYN (r) CS (t) EVELYN (c)  --    Care Plan Review (PT) evaluation/treatment results reviewed;care plan/treatment goals reviewed;patient/other agree to care plan  -EVELYN (r) CS (t) EVELYN (c)  --    Care Plan Review, Other Participant (PT Clinical Impression) daughter  -EVELYN (r) CS (t) EVELYN (c)  --    Row Name 03/27/18 1117          Physical Therapy Goals    Bed Mobility Goal Selection (PT) bed mobility, PT goal 1  -EVELYN (r) CS (t) EVELYN (c)     Transfer Goal Selection (PT) transfer, PT goal 1  -EVELYN (r) CS (t) EVELYN (c)     Row Name 03/27/18 1117          Bed Mobility Goal 1 (PT)    Activity/Assistive Device (Bed Mobility Goal 1, PT) rolling to left;rolling to right  -EVELYN (r) CS (t) EVELYN (c)      [place] Level/Cues Needed (Bed Mobility Goal 1, PT) moderate assist (50-74% patient effort)  -EVELYN (r) CS (t) EVELYN (c)     Time Frame (Bed Mobility Goal 1, PT) long term goal (LTG);10 days  -EVELYN (r) CS (t) EVELYN (c)     Barriers (Bed Mobility Goal 1, PT) cognition, strength  -EVELYN (r) CS (t) EVELYN (c)     Progress/Outcomes (Bed Mobility Goal 1, PT) goal ongoing  -EVELYN (r) CS (t) EVELYN (c)     Row Name 03/27/18 1117          Transfer Goal 1 (PT)    Activity/Assistive Device (Transfer Goal 1, PT) sit-to-stand/stand-to-sit;bed-to-chair/chair-to-bed  -EVELYN (r) CS (t) EVELYN (c)     [place] Level/Cues Needed (Transfer Goal 1, PT) maximum assist (25-49% patient effort)  -EVELYN (r) CS (t) EVELYN (c)     Time Frame (Transfer Goal 1, PT) long term goal (LTG);10 days  -EVELYN (r) CS (t) EVELYN (c)     Barriers (Transfers Goal 1, PT) cognition, strength  -EVELYN (r) CS (t) EVELYN (c)     Progress/Outcome (Transfer Goal 1, PT) goal ongoing  -EVELYN (r) CS (t) EVELYN (c)     Row Name 03/27/18 1117          Positioning and Restraints    Pre-Treatment Position in bed  -EVELYN (r) CS (t) EVELYN (c)     Post Treatment Position bed  -EVELYN (r) CS (t) EVELYN (c)     In Bed fowlers;supine;call light within reach;encouraged to call for assist;with family/caregiver  -EVELYN (r) CS (t) EVELYN (c)     Row Name 03/27/18 1117 03/26/18 Formerly named [facility]       Living Environment    Home Accessibility wheelchair accessible  -EVELYN (r) CS (t) EVELYN (c) wheelchair accessible  -EVELYN (r) CS (t) EVELYN (c)      User Key  (r) = Recorded By, (t) = Taken By, (c) = Cosigned By    Initials Name Provider Type    EVELYN Goodman, PT DPT Physical Therapist    KODY Layton, PT Student PT Student          Physical Therapy Education     Title: PT OT SLP Therapies (Active)     Topic: Physical Therapy (Active)     Point: Mobility training (Active)    Learning Progress Summary     Learner Status Readiness Method Response Comment Documented by    Patient Active Acceptance E NR pt and daughter educated on progression of POC until d/c CS  03/27/18 1309    Family Active Acceptance E NR pt and daughter educated on progression of POC until d/c  03/27/18 1309                      User Key     Initials Effective Dates Name Provider Type Discipline     01/08/18 -  Farhat Layton, PT Student PT Student PT                PT Recommendation and Plan  Anticipated Discharge Disposition (PT): home with hospice, skilled nursing facility (SNF)  Planned Therapy Interventions (PT Eval): balance training, bed mobility training, home exercise program, patient/family education, ROM (range of motion), strengthening, transfer training  Therapy Frequency (PT Clinical Impression): daily  Outcome Summary/Treatment Plan (PT)  Anticipated Discharge Disposition (PT): home with hospice, skilled nursing facility (SNF)  Plan of Care Reviewed With: daughter, patient  Outcome Summary: PT eval completed. pt presents with weakness globally and decreased ROM in B knees and elbows. pt unable to demonstrate functional use of B LE during bed mobility but pt's daughter reports him trying to climb out of bed. pt was max A with VCs to roll to his L and R. During the evaluation pt was confused and unable to respond to many requirests or questions. pt would benefit from skilled PT at this time to address impaired bed mobility, strength, and activity tolerance. PT recommends d/c to SNF or home with home health services.             Outcome Measures     Row Name 03/27/18 1117             How much help from another person do you currently need...    Turning from your back to your side while in flat bed without using bedrails? 2  -EVELYN (r) CS (t) EVELYN (c)      Moving from lying on back to sitting on the side of a flat bed without bedrails? 1  -EVELYN (r) CS (t) EVELYN (c)      Moving to and from a bed to a chair (including a wheelchair)? 1  -EVELYN (r) CS (t) EVELYN (c)      Standing up from a chair using your arms (e.g., wheelchair, bedside chair)? 2  -EVELYN (r) CS (t) EVELYN (c)      Climbing 3-5 steps with a  railing? 1  -EVELYN (r) CS (t) EVELYN (c)      To walk in hospital room? 1  -EVELYN (r) CS (t) EVELYN (c)      AM-PAC 6 Clicks Score 8  -EVELYN (r) CS (t)         Functional Assessment    Outcome Measure Options AM-PAC 6 Clicks Basic Mobility (PT)  -EVELYN (r) CS (t) EVELYN (c)        User Key  (r) = Recorded By, (t) = Taken By, (c) = Cosigned By    Initials Name Provider Type    EVELYN Goodman, PT DPT Physical Therapist    CS Farhat Layton, PT Student PT Student           Time Calculation:         PT Charges     Row Name 03/27/18 1312             Time Calculation    Start Time 1117  -EVELYN (r) CS (t) EVELYN (c)      Stop Time 1137   chart review/attempted eval 3/26/2018 0820-2712 (37 minutes) 57 minutes total  -EVELYN (r) CS (t) EVELYN (c)      Time Calculation (min) 20 min  -EVELYN (r) CS (t)      PT Received On 03/27/18  -EVELYN (r) CS (t) EVELYN (c)      PT Goal Re-Cert Due Date 04/06/18  -EVELYN (r) CS (t) EVELYN (c)        User Key  (r) = Recorded By, (t) = Taken By, (c) = Cosigned By    Initials Name Provider Type    EVELYN Goodman, PT DPT Physical Therapist    CS Farhat Layton, PT Student PT Student          Therapy Charges for Today     Code Description Service Date Service Provider Modifiers Qty    [id number] HC PT EVAL LOW COMPLEXITY 4 3/27/2018 Farhat Layton, PT Student GP 1          PT G-Codes  Outcome Measure Options: AM-PAC 6 Clicks Basic Mobility (PT)  Score: 8  Functional Limitation: Changing and maintaining body position  Changing and Maintaining Body Position Current Status (): At least 80 percent but less than 100 percent impaired, limited or restricted  Changing and Maintaining Body Position Goal Status (): At least 60 percent but less than 80 percent impaired, limited or restricted      Farhat Layton, PT Student  3/27/2018

## 2018-03-27 NOTE — THERAPY TREATMENT NOTE
Acute Care - Speech Language Pathology   Swallow Treatment Note [facility]     Patient Name: Chaim Mackey  : 10/16/1927  MRN: [id number]  Today's Date: 3/27/2018  Onset of Illness/Injury or Date of Surgery: 18     Referring Physician: Dr. Collier      Admit Date: 3/22/2018  Pt completed trials of mechanical soft, pureed, honey, and thin consistencies. The pt exhibited biting of the straw and spoon at times, requiring cues to obtain the bolus from the utensils. No overt s/s of aspiration were observed. He appeared to have adequate mastication of the mechanical soft solid, however it was difficult to assess for oral residue as the pt was unable to follow commands to open his mouth. SLP recommends dysphagia study in radiology to determine if the pt's diet can be upgraded as he has a history of dysphagia.  MARISOL Jennings 3/27/2018 10:19 AM    Visit Dx:      ICD-10-CM ICD-9-CM   1. Altered mental status, unspecified altered mental status type R41.82 780.97   2. Pneumonia of right lung due to infectious organism, unspecified part of lung J18.9 483.8   3. Dysphagia, unspecified type R13.10 787.20     Patient Active Problem List   Diagnosis   • Febrile illness   • Viral syndrome   • Systemic inflammatory response syndrome   • Altered mental status   • Pneumonia   • Dementia       Therapy Treatment    Therapy Treatment / Health Promotion    Treatment Time/Intention  Discipline: speech language pathologist (18 0913 : MARISOL Lopez)  Document Type: therapy note (daily note) (1813 : MARISOL Lopez)  Subjective Information: no complaints (18 0913 : MARISOL Lopez)  Mode of Treatment: speech-language pathology (1813 : MARISOL Lopez)  Patient Effort: adequate (18 : MARISOL Lopez)  Plan of Care Review  Plan of Care Reviewed With: patient, family (18 1013 : Ronel Gonzalez  CCC-SLP)    Vitals/Pain/Safety  Pain Assessment  Additional Documentation: Pain Scale: FACES Pre/Post-Treatment (Group) (03/27/18 0913 : MARISOL Lopez)  Pain Scale: FACES Pre/Post-Treatment  Pain: FACES Scale, Pretreatment: 0-->no hurt (03/27/18 0913 : MARISOL Lopez)    Cognition, Communication, Swallow  Recommendations  Anticipated Dischage Disposition: home with assist, home with home health (03/27/18 0913 : MARISOL Lopez)    Outcome Summary  Outcome Summary/Treatment Plan (SLP)  Daily Summary of Progress (SLP): progress toward functional goals as expected (03/27/18 0913 : MARISOL Lopez)  Barriers to Overall Progress (SLP): impaired cognition (03/27/18 0913 : MARISOL Lopez)  Plan for Continued Treatment (SLP): continue (03/27/18 0913 : MARISOL Lopez)  Anticipated Dischage Disposition: home with assist, home with home health (03/27/18 0913 : MARISOL Lopez)            SLP GOALS     Row Name 03/27/18 0913 03/26/18 0853 03/25/18 1140       Oral Nutrition/Hydration Goal 1 (SLP)    Oral Nutrition/Hydration Goal 1, SLP Pt will tolerate least restricitve diet with no overt s/s of aspiration.  -MB --   Pt will tolerate LRD w/o any overt s/s of aspiration  -TM (r) KB (t) TM (c) Pt will toelrate LRD w/o any overt s/s of aspiration.  -CS    Time Frame (Oral Nutrition/Hydration Goal 1, SLP) short term goal (STG);by discharge  -MB short term goal (STG);by discharge  -TM (r) KB (t) TM (c) by discharge  -CS    Barriers (Oral Nutrition/Hydration Goal 1, SLP) impaired cognition  -MB n/a  -TM (r) KB (t) TM (c)  --    Progress/Outcomes (Oral Nutrition/Hydration Goal 1, SLP) continuing progress toward goal;goal ongoing  -MB goal ongoing  -TM (r) KB (t) TM (c) goal ongoing  -CS       Labial Strengthening Goal 1 (SLP)    Activity (Labial Strengthening Goal 1, SLP) increase labial tone  -MB increase labial tone  -TM (r) KB (t) TM (c)  --     Increase Labial Tone labial resistance exercises  -MB labial resistance exercises;swallow trials  -TM (r) KB (t) TM (c)  --    [place]/Accuracy (Labial Strengthening Goal 1, SLP) independently (over 90% accuracy)  -MB independently (over 90% accuracy)  -TM (r) KB (t) TM (c)  --    Time Frame (Labial Strengthening Goal 1, SLP) short term goal (STG);by discharge  -MB short term goal (STG);by discharge  -TM (r) KB (t) TM (c)  --    Barriers (Labial Strengthening Goal 1, SLP)  -- cognition  -TM (r) KB (t) TM (c)  --    Progress/Outcomes (Labial Strengthening Goal 1, SLP) goal ongoing  -MB goal ongoing  -TM (r) KB (t) TM (c)  --       Pharyngeal Strengthening Exercise Goal 1 (SLP)    Activity (Pharyngeal Strengthening Goal 1, SLP) increase timing  -MB increase timing  -TM (r) KB (t) TM (c)  --    Increase Timing gustatory stimulation (sour/cold)  -MB gustatory stimulation (sour/cold);hard effortful swallow  -TM (r) KB (t) TM (c)  --    [place]/Accuracy (Pharyngeal Strengthening Goal 1, SLP) independently (over 90% accuracy)  -MB independently (over 90% accuracy)  -TM (r) KB (t) TM (c)  --    Time Frame (Pharyngeal Strengthening Goal 1, SLP) short term goal (STG);by discharge  -MB short term goal (STG);by discharge  -TM (r) KB (t) TM (c)  --    Barriers (Pharyngeal Strengthening Goal 1, SLP) impaired cognition  -MB cognition  -TM (r) KB (t) TM (c)  --    Progress/Outcomes (Pharyngeal Strengthening Goal 1, SLP) unable to make needed progress  -MB goal ongoing  -TM (r) KB (t) TM (c)  --    Row Name 03/24/18 7990             Oral Nutrition/Hydration Goal 1 (SLP)    Oral Nutrition/Hydration Goal 1, SLP Pt will tolerate LRD w/o any overt s/s of aspiration.  -CS      Time Frame (Oral Nutrition/Hydration Goal 1, SLP) by discharge  -CS      Progress/Outcomes (Oral Nutrition/Hydration Goal 1, SLP) goal ongoing  -CS        User Key  (r) = Recorded By, (t) = Taken By, (c) = Cosigned By    Initials Name Provider Type    MB  Ronel Gonzalez, CCC-SLP Speech and Language Pathologist     Valentina Tovar, CCC-SLP Speech and Language Pathologist    KODY Cazares, MS CCC-SLP Speech and Language Pathologist    KRISTIN Joyce, Speech Therapy Student Speech Therapy Student          EDUCATION  The patient has been educated in the following areas:   Dysphagia (Swallowing Impairment).    SLP Recommendation and Plan                       Anticipated Dischage Disposition: home with assist, home with home health                Plan of Care Reviewed With: patient, family  Plan of Care Review  Plan of Care Reviewed With: patient, family  Daily Summary of Progress (SLP): progress toward functional goals as expected  Plan for Continued Treatment (SLP): continue  Progress: improving  Outcome Summary: Pt completed trials of mechanical soft, pureed, honey, and thin consistencies. The pt exhibited biting of the straw and spoon at times, requiring cues to obtain the bolus from the utensils. No overt s/s of aspiration were observed. He appeared to have adequate mastication of the mechanical soft solid, however it was difficult to assess for oral residue as the pt was unable to follow commands to open his mouth. SLP recommends dysphagia study in radiology to determine if the pt's diet can be upgraded as he has a history of dysphagia.       SLP Outcome Measures (last 72 hours)      SLP Outcome Measures     Row Name 03/26/18 0853             SLP Outcome Measures    Outcome Measure Used? Adult NOMS  -TM (r) KB (t) TM (c)         FCM Scores    FCM Chosen Swallowing  -TM (r) KB (t) TM (c)      Swallowing FCM Score 4  -TM (r) KB (t) TM (c)        User Key  (r) = Recorded By, (t) = Taken By, (c) = Cosigned By    Initials Name Effective Dates     Valentina Tovar CCC-SLP 08/02/16 -     KRISTIN Joyce, Speech Therapy Student 03/02/18 -              Time Calculation:         Time Calculation- SLP     Row Name 03/27/18 1019             Time Calculation- SLP     SLP Start Time 0913  -MB      SLP Stop Time 0944  -MB      SLP Time Calculation (min) 31 min  -MB      SLP Received On 03/27/18  -MB        User Key  (r) = Recorded By, (t) = Taken By, (c) = Cosigned By    Initials Name Provider Type    MARISOL Porter Speech and Language Pathologist          Therapy Charges for Today     Code Description Service Date Service Provider Modifiers Qty    80865927600 HC ST TREATMENT SWALLOW 2 3/27/2018 MARISOL Lopez GN 1          SLP G-Codes  SLP NOMS Used?: Yes  Functional Limitations: Swallowing  Swallow Current Status (): At least 20 percent but less than 40 percent impaired, limited or restricted  Swallow Goal Status (): At least 1 percent but less than 20 percent impaired, limited or restricted      MARISOL Jennings  3/27/2018

## 2018-03-27 NOTE — PLAN OF CARE
Problem: Patient Care Overview  Goal: Plan of Care Review  Outcome: Ongoing (interventions implemented as appropriate)   03/27/18 1013   Coping/Psychosocial   Plan of Care Reviewed With patient;family   Plan of Care Review   Progress improving   OTHER   Outcome Summary Pt completed trials of mechanical soft, pureed, honey, and thin consistencies. The pt exhibited biting of the straw and spoon at times, requiring cues to obtain the bolus from the utensils. No overt s/s of aspiration were observed. He appeared to have adequate mastication of the mechanical soft solid, however it was difficult to assess for oral residue as the pt was unable to follow commands to open his mouth. SLP recommends dysphagia study in radiology to determine if the pt's diet can be upgraded as he has a history of dysphagia.

## 2018-03-27 NOTE — PROGRESS NOTES
Continued Stay Note  [facility]ERTIN Nickerson     Patient Name: Chaim Mackey  MRN: [id number]  Today's Date: 3/27/2018    Admit Date: 3/22/2018          Discharge Plan     Row Name 03/27/18 0914       Plan    Plan [facility] referral given    Patient/Family in Agreement with Plan yes    Plan Comments Hospice consult written, informed Camila Anderson of referral [phone]. She will be here today to meet with family. Will follow.               Discharge Codes    No documentation.           OUMAR Rodriguez

## 2018-03-27 NOTE — PROGRESS NOTES
Continued Stay Note   [place]     Patient Name: Chaim Mackey  MRN: [id number]  Today's Date: 3/27/2018    Admit Date: 3/22/2018          Discharge Plan     Row Name 03/27/18 1457       Plan    Plan [facility]    Patient/Family in Agreement with Plan yes    Final Discharge Disposition Code 50 - home with hospice    Final Note Pt is being discharged with [facility] today. Informed Camila Anderson from hospice of d/c status today.     Row Name 03/27/18 1254       Plan    Plan [facility]    Patient/Family in Agreement with Plan yes    Plan Comments Camila Anderson from [facility] has met with dtr and all set up for pt to be discharged with [facility]. She can go home when MD discharges.               Discharge Codes    No documentation.       Expected Discharge Date and Time     Expected Discharge Date Expected Discharge Time    Mar 27, 2018             OUMAR Rodriguez

## 2018-03-27 NOTE — PLAN OF CARE
Problem: Patient Care Overview  Goal: Plan of Care Review  Outcome: Ongoing (interventions implemented as appropriate)   03/27/18 1117   Coping/Psychosocial   Plan of Care Reviewed With daughter;patient   OTHER   Outcome Summary PT eval completed. pt presents with weakness globally and decreased ROM in B knees and elbows. pt unable to demonstrate functional use of B LE during bed mobility but pt's daughter reports him trying to climb out of bed. pt was max A with VCs to roll to his L and R. During the evaluation pt was confused and unable to respond to many requirests or questions. pt would benefit from skilled PT at this time to address impaired bed mobility, strength, and activity tolerance. PT recommends d/c to SNF or home with home health services.

## 2018-03-28 LAB
BACTERIA SPEC AEROBE CULT: NORMAL

## 2018-03-28 NOTE — THERAPY DISCHARGE NOTE
Acute Care - Physical Therapy Discharge Summary  [facility]       Patient Name: Chaim Mackey  : 10/16/1927  MRN: [id number]    Today's Date: 3/28/2018  Onset of Illness/Injury or Date of Surgery: 18    Date of Referral to PT: 18  Referring Physician: Dr. Collier      Admit Date: 3/22/2018      PT Recommendation and Plan    Visit Dx:    ICD-10-CM ICD-9-CM   1. Altered mental status, unspecified altered mental status type R41.82 780.97   2. Pneumonia of right lung due to infectious organism, unspecified part of lung J18.9 483.8   3. Dysphagia, unspecified type R13.10 787.20   4. Impaired functional mobility, balance, gait, and endurance Z74.09 V49.89             Outcome Measures     Row Name 18 1117             How much help from another person do you currently need...    Turning from your back to your side while in flat bed without using bedrails? 2  -EVELYN (r) CS (t) EVELYN (c)      Moving from lying on back to sitting on the side of a flat bed without bedrails? 1  -EVELYN (r) CS (t) EVELYN (c)      Moving to and from a bed to a chair (including a wheelchair)? 1  -EVELYN (r) CS (t) EVELYN (c)      Standing up from a chair using your arms (e.g., wheelchair, bedside chair)? 2  -EVELYN (r) CS (t) EVELYN (c)      Climbing 3-5 steps with a railing? 1  -EVELYN (r) CS (t) EVELYN (c)      To walk in hospital room? 1  -EVELYN (r) CS (t) EVELYN (c)      AM-PAC 6 Clicks Score 8  -EVELYN (r) CS (t)         Functional Assessment    Outcome Measure Options AM-PAC 6 Clicks Basic Mobility (PT)  -EVELYN (r) CS (t) EVELYN (c)        User Key  (r) = Recorded By, (t) = Taken By, (c) = Cosigned By    Initials Name Provider Type    EVELYN Goodamn, PT DPT Physical Therapist    KODY Layton, PT Student PT Student                      PT Rehab Goals     Row Name 18 1500 18 1117          Bed Mobility Goal 1 (PT)    Activity/Assistive Device (Bed Mobility Goal 1, PT)  -- rolling to left;rolling to right  -EVELYN (r) CS (t) EVELYN (c)     [place]  Level/Cues Needed (Bed Mobility Goal 1, PT)  -- moderate assist (50-74% patient effort)  -EVELYN (r) CS (t) EVELYN (c)     Time Frame (Bed Mobility Goal 1, PT)  -- long term goal (LTG);10 days  -EVELYN (r) CS (t) EVELYN (c)     Barriers (Bed Mobility Goal 1, PT)  -- cognition, strength  -EVELYN (r) CS (t) EVELYN (c)     Progress/Outcomes (Bed Mobility Goal 1, PT) goal not met  -MF goal ongoing  -EVELYN (r) CS (t) EVELYN (c)        Transfer Goal 1 (PT)    Activity/Assistive Device (Transfer Goal 1, PT)  -- sit-to-stand/stand-to-sit;bed-to-chair/chair-to-bed  -EVELYN (r) CS (t) EVELYN (c)     [place] Level/Cues Needed (Transfer Goal 1, PT)  -- maximum assist (25-49% patient effort)  -EVELYN (r) CS (t) EVELYN (c)     Time Frame (Transfer Goal 1, PT)  -- long term goal (LTG);10 days  -EVELYN (r) CS (t) EVELYN (c)     Barriers (Transfers Goal 1, PT)  -- cognition, strength  -EVELYN (r) CS (t) EVELYN (c)     Progress/Outcome (Transfer Goal 1, PT) goal not met  -MF goal ongoing  -EVELYN (r) CS (t) EVELYN (c)       User Key  (r) = Recorded By, (t) = Taken By, (c) = Cosigned By    Initials Name Provider Type    EVELYN Goodman, PT DPT Physical Therapist     Olivia Zayas, PTA Physical Therapy Assistant     Farhat Layton, PT Student PT Student              PT Discharge Summary  Anticipated Discharge Disposition (PT): home with hospice  Reason for Discharge: Discharge from facility  Outcomes Achieved: Discharge from facility occurred on same date as evluation  Discharge Destination: other (comment) (home hospice)      Olivia Zayas PTA   3/28/2018

## 2018-03-30 ENCOUNTER — TELEPHONE (OUTPATIENT)
Dept: SOCIAL WORK | Facility: HOSPITAL | Age: 83
End: 2018-03-30

## 2018-03-30 NOTE — TELEPHONE ENCOUNTER
[facility] post discharge f/u call to home.  Spoke with care giver who states he is much improved.  Verified f/u appts and denies any needs from [employer] [facility].

## 2018-08-09 RX ORDER — IPRATROPIUM BROMIDE AND ALBUTEROL SULFATE 2.5; .5 MG/3ML; MG/3ML
SOLUTION RESPIRATORY (INHALATION)
Qty: 90 ML | Refills: 4 | Status: SHIPPED | OUTPATIENT
Start: 2018-08-09

## 2018-08-09 NOTE — TELEPHONE ENCOUNTER
James Rondon called requesting a refill of the below medication which has been pended for you:     Requested Prescriptions     Pending Prescriptions Disp Refills    ipratropium-albuterol (DUONEB) 0.5-2.5 (3) MG/3ML SOLN nebulizer solution [Pharmacy Med Name: IPRAT-ALBUT 0.5-3(2.5) MG/3 ML] 90 mL 4     Sig: INHALE CONTENTS OF 1 VIAL BY NEBULIZATION 3 TIMES A DAY       Last Appointment Date: Visit date not found  Next Appointment Date: Visit date not found    Allergies not on file

## 2025-04-28 NOTE — PLAN OF CARE
Patient heart rate ran at 47-52 Saturday 4/26. Patient called to discuss symptoms.     # [phone]   Problem: Patient Care Overview  Goal: Plan of Care Review   03/27/18 0257   Coping/Psychosocial   Plan of Care Reviewed With patient   Plan of Care Review   Progress no change     Goal: Individualization and Mutuality  Outcome: Ongoing (interventions implemented as appropriate)      Problem: Skin Injury Risk (Adult)  Goal: Skin Health and Integrity  Outcome: Ongoing (interventions implemented as appropriate)      Problem: Confusion, Acute (Adult)  Goal: Cognitive/Functional Impairments Minimized  Outcome: Ongoing (interventions implemented as appropriate)    Goal: Safety  Outcome: Ongoing (interventions implemented as appropriate)      Problem: Infection, Risk/Actual (Adult)  Goal: Infection Prevention/Resolution  Outcome: Ongoing (interventions implemented as appropriate)      Problem: Nutrition, Imbalanced: Inadequate Oral Intake (Adult)  Goal: Prevent Further Weight Loss  Outcome: Outcome(s) achieved Date Met: 03/27/18